# Patient Record
Sex: FEMALE | Race: WHITE | NOT HISPANIC OR LATINO | Employment: FULL TIME | URBAN - METROPOLITAN AREA
[De-identification: names, ages, dates, MRNs, and addresses within clinical notes are randomized per-mention and may not be internally consistent; named-entity substitution may affect disease eponyms.]

---

## 2019-05-09 ENCOUNTER — OFFICE VISIT (OUTPATIENT)
Dept: URGENT CARE | Facility: CLINIC | Age: 44
End: 2019-05-09
Payer: COMMERCIAL

## 2019-05-09 VITALS
RESPIRATION RATE: 18 BRPM | TEMPERATURE: 98.8 F | OXYGEN SATURATION: 97 % | DIASTOLIC BLOOD PRESSURE: 82 MMHG | SYSTOLIC BLOOD PRESSURE: 161 MMHG | HEART RATE: 85 BPM

## 2019-05-09 DIAGNOSIS — J06.9 VIRAL URI WITH COUGH: Primary | ICD-10-CM

## 2019-05-09 DIAGNOSIS — R60.0 LEG EDEMA, RIGHT: ICD-10-CM

## 2019-05-09 PROCEDURE — G0382 LEV 3 HOSP TYPE B ED VISIT: HCPCS | Performed by: NURSE PRACTITIONER

## 2019-05-09 PROCEDURE — S9083 URGENT CARE CENTER GLOBAL: HCPCS | Performed by: NURSE PRACTITIONER

## 2019-05-09 RX ORDER — OMEPRAZOLE 40 MG/1
40 CAPSULE, DELAYED RELEASE ORAL DAILY
Refills: 5 | COMMUNITY
Start: 2019-04-19 | End: 2019-07-25 | Stop reason: SDUPTHER

## 2019-05-09 RX ORDER — PROPRANOLOL HCL 60 MG
CAPSULE, EXTENDED RELEASE 24HR ORAL
COMMUNITY
Start: 2013-05-07 | End: 2019-07-09 | Stop reason: SDUPTHER

## 2019-05-13 ENCOUNTER — TRANSCRIBE ORDERS (OUTPATIENT)
Dept: ADMINISTRATIVE | Facility: HOSPITAL | Age: 44
End: 2019-05-13

## 2019-05-13 DIAGNOSIS — R60.9 EDEMA, UNSPECIFIED TYPE: Primary | ICD-10-CM

## 2019-05-13 DIAGNOSIS — R52 PAIN: ICD-10-CM

## 2019-05-16 ENCOUNTER — HOSPITAL ENCOUNTER (OUTPATIENT)
Dept: NON INVASIVE DIAGNOSTICS | Facility: HOSPITAL | Age: 44
Discharge: HOME | End: 2019-05-16
Payer: COMMERCIAL

## 2019-05-16 DIAGNOSIS — R60.9 EDEMA, UNSPECIFIED TYPE: ICD-10-CM

## 2019-05-16 DIAGNOSIS — R52 PAIN: ICD-10-CM

## 2019-05-16 PROCEDURE — 93970 EXTREMITY STUDY: CPT

## 2019-05-16 PROCEDURE — 93970 EXTREMITY STUDY: CPT | Performed by: SURGERY

## 2019-07-09 DIAGNOSIS — I10 HYPERTENSION, UNSPECIFIED TYPE: Primary | ICD-10-CM

## 2019-07-09 RX ORDER — PROPRANOLOL HCL 60 MG
60 CAPSULE, EXTENDED RELEASE 24HR ORAL DAILY
Qty: 90 CAPSULE | Refills: 3 | Status: SHIPPED | OUTPATIENT
Start: 2019-07-09 | End: 2020-06-30

## 2019-07-25 DIAGNOSIS — K21.9 GASTROESOPHAGEAL REFLUX DISEASE, ESOPHAGITIS PRESENCE NOT SPECIFIED: Primary | ICD-10-CM

## 2019-07-25 RX ORDER — OMEPRAZOLE 40 MG/1
40 CAPSULE, DELAYED RELEASE ORAL DAILY
Qty: 90 CAPSULE | Refills: 3 | Status: SHIPPED | OUTPATIENT
Start: 2019-07-25 | End: 2020-07-20

## 2019-10-28 DIAGNOSIS — R60.9 EDEMA, UNSPECIFIED TYPE: Primary | ICD-10-CM

## 2019-10-29 RX ORDER — FUROSEMIDE 20 MG/1
TABLET ORAL
Qty: 90 TABLET | Refills: 1 | Status: SHIPPED | OUTPATIENT
Start: 2019-10-29 | End: 2020-04-22 | Stop reason: SDUPTHER

## 2019-12-16 ENCOUNTER — OFFICE VISIT (OUTPATIENT)
Dept: FAMILY MEDICINE CLINIC | Facility: CLINIC | Age: 44
End: 2019-12-16
Payer: COMMERCIAL

## 2019-12-16 VITALS
DIASTOLIC BLOOD PRESSURE: 72 MMHG | WEIGHT: 191 LBS | HEART RATE: 77 BPM | HEIGHT: 63 IN | SYSTOLIC BLOOD PRESSURE: 128 MMHG | OXYGEN SATURATION: 96 % | TEMPERATURE: 98.9 F | BODY MASS INDEX: 33.84 KG/M2

## 2019-12-16 DIAGNOSIS — Z12.39 SCREENING FOR BREAST CANCER: ICD-10-CM

## 2019-12-16 DIAGNOSIS — E78.5 DYSLIPIDEMIA: ICD-10-CM

## 2019-12-16 DIAGNOSIS — R53.83 OTHER FATIGUE: ICD-10-CM

## 2019-12-16 DIAGNOSIS — S29.019A THORACIC MYOFASCIAL STRAIN, INITIAL ENCOUNTER: ICD-10-CM

## 2019-12-16 DIAGNOSIS — J01.90 ACUTE NON-RECURRENT SINUSITIS, UNSPECIFIED LOCATION: Primary | ICD-10-CM

## 2019-12-16 DIAGNOSIS — R07.89 MUSCULOSKELETAL CHEST PAIN: ICD-10-CM

## 2019-12-16 PROCEDURE — 1036F TOBACCO NON-USER: CPT | Performed by: FAMILY MEDICINE

## 2019-12-16 PROCEDURE — 99213 OFFICE O/P EST LOW 20 MIN: CPT | Performed by: FAMILY MEDICINE

## 2019-12-16 PROCEDURE — 3008F BODY MASS INDEX DOCD: CPT | Performed by: FAMILY MEDICINE

## 2019-12-16 RX ORDER — AMOXICILLIN 500 MG/1
500 CAPSULE ORAL EVERY 8 HOURS SCHEDULED
Qty: 30 CAPSULE | Refills: 0 | Status: SHIPPED | OUTPATIENT
Start: 2019-12-16 | End: 2019-12-26

## 2019-12-16 NOTE — PROGRESS NOTES
Assessment/Plan:    Musculoskeletal chest pain  Patient merlos is clearly musculoskeletal in etiology  Pain is reproducible upon compression  I have simply recommended she take Aleve twice a day with food until resolved  s some chest pain    Thoracic myofascial strain  Patient has a thoracic strain  I recommended moist heat to the affected area  Again, I feel that Aleve will help this problem  She should take it twice a day with food until improved  I have also stressed to the patient the importance of rest     Acute non-recurrent sinusitis  Patient has an acute sinusitis  She was started on oral antibiotics  I have also recommended she use Ayr nasal spray, 2 sprays to each nostril 3-4 times per day until improved       Diagnoses and all orders for this visit:    Acute non-recurrent sinusitis, unspecified location  -     amoxicillin (AMOXIL) 500 mg capsule; Take 1 capsule (500 mg total) by mouth every 8 (eight) hours for 10 days    Screening for breast cancer  -     Mammo screening bilateral w 3d & cad; Future    Other fatigue  -     Comprehensive metabolic panel; Future  -     TSH, 3rd generation; Future  -     CBC and differential; Future    Dyslipidemia  -     Lipid panel; Future    Thoracic myofascial strain, initial encounter    Musculoskeletal chest pain          Subjective:      Patient ID: Giovani Hernandez is a 40 y o  female  This patient is a 44-year-old white female who presents to the office today with complaints of her right eye being swollen and crusty, a postnasal drip, nasal congestion, cough, and slight chest congestion  It began 1 week ago  She denies any sputum production  She complains of postnasal drip  She denies shortness of breath  She denies fever and chills  She took Robitussin CF  She also complains of right shoulder pain  She wonders if it is related  She tells me the pain is in the area of the right scapula    She admits that she was doing a lot of heavy lifting and moving of furniture  She also complains of a pain in her chest if he pushes on it  She tells me she was wearing a scarf with a not on it  She leaned against a shopping cart and developed this pain  She has persistent pain if she touches the area now  The following portions of the patient's history were reviewed and updated as appropriate: allergies, current medications, past family history, past medical history, past social history, past surgical history and problem list     Review of Systems   Cardiovascular: Positive for chest pain (Patient reports pain in the left upper chest, in the shoulder area which radiates into the axilla  It only occurs with certain movements of her arm)  Gastrointestinal: Negative for abdominal distention, abdominal pain, blood in stool, constipation, diarrhea, nausea and vomiting  Musculoskeletal:        Reports right shoulder pain         Objective:      /72 (BP Location: Left arm, Patient Position: Sitting, Cuff Size: Large)   Pulse 77   Temp 98 9 °F (37 2 °C) (Tympanic)   Ht 5' 3 25" (1 607 m)   Wt 86 6 kg (191 lb)   SpO2 96%   BMI 33 57 kg/m²          Physical Exam   Constitutional:   Patient is a 51-year-old white female who appears her stated age  She is nonseptic in appearance and in no distress   HENT:   Head: Normocephalic and atraumatic  Right Ear: External ear normal    Left Ear: External ear normal    Mouth/Throat: Oropharynx is clear and moist  No oropharyngeal exudate  Tympanic membranes are clear  Nose was congested  Nasal turbinates are dry, red, and angry  There was no rhinorrhea noted  Eyes: Pupils are equal, round, and reactive to light  Conjunctivae are normal  No scleral icterus  Neck: Neck supple  No tracheal deviation present  No thyromegaly present  Cardiovascular: Normal rate, regular rhythm and normal heart sounds  Exam reveals no gallop and no friction rub  No murmur heard    Pulmonary/Chest: Effort normal and breath sounds normal  No stridor  No respiratory distress  She has no wheezes  She has no rales  Musculoskeletal:   There is full range of motion of the shoulders in all planes of movement  Empty can sign is negative  Jacobo impingement sign is negative  Neer impingement sign is negative  Negative for painful arc  No tenderness to palpation in the shoulder  There was tenderness to palpation in the rhomboid muscles and right upper thoracic musculature  Lymphadenopathy:     She has no cervical adenopathy  Vitals reviewed

## 2019-12-16 NOTE — PATIENT INSTRUCTIONS
Sinusitis   WHAT YOU NEED TO KNOW:   What is sinusitis? Sinusitis is inflammation or infection of your sinuses  It is most often caused by a virus  Acute sinusitis may last up to 12 weeks  Chronic sinusitis lasts longer than 12 weeks  Recurrent sinusitis means you have 4 or more times in 1 year  What increases my risk for sinusitis? · Medical conditions, such as an upper respiratory infection, allergies, asthma, or cystic fibrosis    · Dental infections or procedures, such as gum infections, tooth decay, or a root canal    · Smoking    · Abnormal sinus structure, such as nasal growths, swollen tonsils, or a deviated septum    · A weak immune system, from diseases such as diabetes or HIV  What are the signs and symptoms of sinusitis? · Fever    · Pain, pressure, redness, or swelling around the forehead, cheeks, or eyes    · Thick yellow or green discharge from your nose    · Tenderness when you touch your face over your sinuses    · Dry cough that happens mostly at night or when you lie down    · Headache and face pain that is worse when you lean forward    · Tooth pain, or pain when you chew  How is sinusitis diagnosed? Your healthcare provider will examine you and ask about your symptoms  He or she will check inside your nose using a nasal speculum  This is a small tool used to open your nostrils  A sample of the mucus from your nose may show what germ is causing your infection  How is sinusitis treated? Your symptoms may go away on their own  Your healthcare provider may recommend watchful waiting for up to 10 days before starting antibiotics  You may  need any of the following:  · Acetaminophen  decreases pain and fever  It is available without a doctor's order  Ask how much to take and how often to take it  Follow directions   Read the labels of all other medicines you are using to see if they also contain acetaminophen, or ask your doctor or pharmacist  Acetaminophen can cause liver damage if not taken correctly  Do not use more than 4 grams (4,000 milligrams) total of acetaminophen in one day  · NSAIDs , such as ibuprofen, help decrease swelling, pain, and fever  This medicine is available with or without a doctor's order  NSAIDs can cause stomach bleeding or kidney problems in certain people  If you take blood thinner medicine, always ask your healthcare provider if NSAIDs are safe for you  Always read the medicine label and follow directions  · Nasal steroid sprays  may help decrease inflammation in your nose and sinuses  · Decongestants  help reduce swelling and drain mucus in the nose and sinuses  They may help you breathe easier  · Antihistamines  help dry mucus in the nose and relieve sneezing  · Antibiotics  help treat or prevent a bacterial infection  How can I manage my symptoms? · Rinse your sinuses  Use a sinus rinse device to rinse your nasal passages with a saline (salt water) solution or distilled water  Do not use tap water  This will help thin the mucus in your nose and rinse away pollen and dirt  It will also help reduce swelling so you can breathe normally  Ask your healthcare provider how often to do this  · Breathe in steam   Heat a bowl of water until you see steam  Lean over the bowl and make a tent over your head with a large towel  Breathe deeply for about 20 minutes  Be careful not to get too close to the steam or burn yourself  Do this 3 times a day  You can also breathe deeply when you take a hot shower  · Sleep with your head elevated  Place an extra pillow under your head before you go to sleep to help your sinuses drain  · Drink liquids as directed  Ask your healthcare provider how much liquid to drink each day and which liquids are best for you  Liquids will thin the mucus in your nose and help it drain  Avoid drinks that contain alcohol or caffeine  · Do not smoke, and avoid secondhand smoke    Nicotine and other chemicals in cigarettes and cigars can make your symptoms worse  Ask your healthcare provider for information if you currently smoke and need help to quit  E-cigarettes or smokeless tobacco still contain nicotine  Talk to your healthcare provider before you use these products  How can I help prevent the spread of germs that cause sinusitis? Wash your hands often with soap and water  Wash your hands after you use the bathroom, change a child's diaper, or sneeze  Wash your hands before you prepare or eat food  When should I seek immediate care? · Your eye and eyelid are red, swollen, and painful  · You cannot open your eye  · You have vision changes, such as double vision  · Your eyeball bulges out or you cannot move your eye  · You are more sleepy than normal, or you notice changes in your ability to think, move, or talk  · You have a stiff neck, a fever, or a bad headache  · You have swelling of your forehead or scalp  When should I contact my healthcare provider? · Your symptoms do not improve after 3 days  · Your symptoms do not go away after 10 days  · You have nausea and are vomiting  · Your nose is bleeding  · You have questions or concerns about your condition or care  CARE AGREEMENT:   You have the right to help plan your care  Learn about your health condition and how it may be treated  Discuss treatment options with your caregivers to decide what care you want to receive  You always have the right to refuse treatment  The above information is an  only  It is not intended as medical advice for individual conditions or treatments  Talk to your doctor, nurse or pharmacist before following any medical regimen to see if it is safe and effective for you  © 2017 [employer] Information is for End User's use only and may not be sold, redistributed or otherwise used for commercial purposes   All illustrations and images included in CareNotes® are the copyrighted property of A [employer] M [employer]  or Christo Gauthier

## 2019-12-17 NOTE — ASSESSMENT & PLAN NOTE
Patient has an acute sinusitis  She was started on oral antibiotics    I have also recommended she use Ayr nasal spray, 2 sprays to each nostril 3-4 times per day until improved

## 2019-12-17 NOTE — ASSESSMENT & PLAN NOTE
Patient has a thoracic strain  I recommended moist heat to the affected area  Again, I feel that Aleve will help this problem  She should take it twice a day with food until improved    I have also stressed to the patient the importance of rest

## 2019-12-17 NOTE — ASSESSMENT & PLAN NOTE
Patient merlos is clearly musculoskeletal in etiology  Pain is reproducible upon compression    I have simply recommended she take Aleve twice a day with food until resolved  s some chest pain

## 2020-01-18 ENCOUNTER — HOSPITAL ENCOUNTER (OUTPATIENT)
Dept: MAMMOGRAPHY | Facility: HOSPITAL | Age: 45
Discharge: HOME | End: 2020-01-18
Attending: FAMILY MEDICINE
Payer: COMMERCIAL

## 2020-01-18 VITALS — HEIGHT: 63 IN | WEIGHT: 191 LBS | BODY MASS INDEX: 33.84 KG/M2

## 2020-01-18 DIAGNOSIS — Z12.39 SCREENING FOR BREAST CANCER: ICD-10-CM

## 2020-01-18 PROCEDURE — 77067 SCR MAMMO BI INCL CAD: CPT

## 2020-01-18 PROCEDURE — 77063 BREAST TOMOSYNTHESIS BI: CPT

## 2020-04-22 DIAGNOSIS — R60.9 EDEMA, UNSPECIFIED TYPE: ICD-10-CM

## 2020-04-22 RX ORDER — FUROSEMIDE 20 MG/1
20 TABLET ORAL AS NEEDED
Qty: 90 TABLET | Refills: 2 | Status: SHIPPED | OUTPATIENT
Start: 2020-04-22 | End: 2021-01-20

## 2020-06-30 DIAGNOSIS — I10 HYPERTENSION, UNSPECIFIED TYPE: ICD-10-CM

## 2020-06-30 RX ORDER — PROPRANOLOL HCL 60 MG
CAPSULE, EXTENDED RELEASE 24HR ORAL
Qty: 90 CAPSULE | Refills: 0 | Status: SHIPPED | OUTPATIENT
Start: 2020-06-30 | End: 2020-09-25

## 2020-07-20 DIAGNOSIS — K21.9 GASTROESOPHAGEAL REFLUX DISEASE, ESOPHAGITIS PRESENCE NOT SPECIFIED: ICD-10-CM

## 2020-07-20 RX ORDER — OMEPRAZOLE 40 MG/1
CAPSULE, DELAYED RELEASE ORAL
Qty: 90 CAPSULE | Refills: 3 | Status: SHIPPED | OUTPATIENT
Start: 2020-07-20 | End: 2021-08-02 | Stop reason: SDUPTHER

## 2020-07-27 ENCOUNTER — OFFICE VISIT (OUTPATIENT)
Dept: FAMILY MEDICINE CLINIC | Facility: CLINIC | Age: 45
End: 2020-07-27
Payer: COMMERCIAL

## 2020-07-27 VITALS
WEIGHT: 196.7 LBS | SYSTOLIC BLOOD PRESSURE: 126 MMHG | DIASTOLIC BLOOD PRESSURE: 82 MMHG | OXYGEN SATURATION: 97 % | HEIGHT: 63 IN | TEMPERATURE: 98.9 F | BODY MASS INDEX: 34.85 KG/M2 | HEART RATE: 80 BPM

## 2020-07-27 DIAGNOSIS — R13.10 DYSPHAGIA, UNSPECIFIED TYPE: ICD-10-CM

## 2020-07-27 DIAGNOSIS — F32.0 MAJOR DEPRESSIVE DISORDER, SINGLE EPISODE, MILD (HCC): ICD-10-CM

## 2020-07-27 DIAGNOSIS — G43.009 MIGRAINE WITHOUT AURA AND WITHOUT STATUS MIGRAINOSUS, NOT INTRACTABLE: Primary | ICD-10-CM

## 2020-07-27 PROCEDURE — 99213 OFFICE O/P EST LOW 20 MIN: CPT | Performed by: FAMILY MEDICINE

## 2020-07-27 PROCEDURE — 3079F DIAST BP 80-89 MM HG: CPT | Performed by: FAMILY MEDICINE

## 2020-07-27 PROCEDURE — 3008F BODY MASS INDEX DOCD: CPT | Performed by: FAMILY MEDICINE

## 2020-07-27 PROCEDURE — 3074F SYST BP LT 130 MM HG: CPT | Performed by: FAMILY MEDICINE

## 2020-07-27 PROCEDURE — 1036F TOBACCO NON-USER: CPT | Performed by: FAMILY MEDICINE

## 2020-07-27 RX ORDER — BUPROPION HYDROCHLORIDE 150 MG/1
TABLET, EXTENDED RELEASE ORAL
Qty: 60 TABLET | Refills: 5 | Status: SHIPPED | OUTPATIENT
Start: 2020-07-27 | End: 2021-01-20

## 2020-07-27 NOTE — ASSESSMENT & PLAN NOTE
Patient requested referral to Gastroenterology  She had seen Dr Enma Alexander in the past   She did not want to see him in his [place] office  Patient was referred to Dr Nelly Fortune for further evaluation of her dysphagia  She will continue omeprazole 40 mg daily

## 2020-07-27 NOTE — PROGRESS NOTES
Assessment/Plan:    Major depressive disorder, single episode, mild ([address] )  Patient will be restarted on bupropion  mg  She will take 1 daily for 3 days, then 1 twice a day  Migraine without aura and without status migrainosus, not intractable  Migraines are currently stable  Patient will continue propranolol ER 60 mg daily    Dysphagia  Patient requested referral to Gastroenterology  She had seen Dr Vikas Langford in the past   She did not want to see him in his [id number] [facility] office  Patient was referred to Dr Joelle Jordan for further evaluation of her dysphagia  She will continue omeprazole 40 mg daily  Diagnoses and all orders for this visit:    Migraine without aura and without status migrainosus, not intractable    Dysphagia, unspecified type  -     Ambulatory referral to Gastroenterology; Future    Major depressive disorder, single episode, mild ([facility])  -     buPROPion (WELLBUTRIN SR) 150 mg 12 hr tablet; Take 1 daily x 3 days, then 1 twice a day        BMI Counseling: Body mass index is 34 84 kg/m²  The BMI is above normal  Nutrition recommendations include reducing portion sizes, decreasing overall calorie intake, 3-5 servings of fruits/vegetables daily, reducing fast food intake, consuming healthier snacks, decreasing soda and/or juice intake and moderation in carbohydrate intake  Exercise recommendations include exercising 3-5 times per week  Subjective:      Patient ID: Brookie Felty is a 40 y o  female  This is a 80-year-old white female presents to the office today for her routine checkup  Patient has been working from home  As a result, she has gained weight since her last visit  She complains of feeling stressed  She complains of fatigue  She wants to go back on bupropion  This helped her in the past   She tells me she is starting to have a problem with food sticking    She had had this in the past   Her gastroenterologist had spoke to her about esophageal dilation but she never went through with the procedure  Her last EGD was 5 years ago  She denies any heartburn  The following portions of the patient's history were reviewed and updated as appropriate: allergies, current medications, past family history, past medical history, past social history, past surgical history and problem list     Review of Systems   Constitutional: Positive for fatigue  HENT: Positive for trouble swallowing  Cardiovascular: Positive for leg swelling (Reports ankle edema)  Negative for chest pain and palpitations  Gastrointestinal: Negative for abdominal distention, abdominal pain, blood in stool, constipation, diarrhea and nausea  Denies heartburn   Musculoskeletal: Positive for myalgias  Neurological: Negative for headaches  Psychiatric/Behavioral:        Reports stress         Objective:      /82 (BP Location: Left arm, Patient Position: Sitting, Cuff Size: Adult)   Pulse 80   Temp 98 9 °F (37 2 °C) (Temporal)   Ht 5' 3" (1 6 m)   Wt 89 2 kg (196 lb 11 2 oz)   SpO2 97%   BMI 34 84 kg/m²          Physical Exam   Constitutional:   The patient is an obese 60-year-old white female who appears her stated age  She is in no distress   HENT:   Head: Normocephalic and atraumatic  Right Ear: External ear normal    Left Ear: External ear normal    Mouth/Throat: Oropharynx is clear and moist  No oropharyngeal exudate  Tympanic membranes are clear   Eyes: Pupils are equal, round, and reactive to light  Conjunctivae are normal  Right eye exhibits no discharge  Left eye exhibits no discharge  No scleral icterus  Neck: Neck supple  No tracheal deviation present  No thyromegaly present  Cardiovascular: Normal rate, regular rhythm and normal heart sounds  Exam reveals no gallop and no friction rub  No murmur heard  Pulmonary/Chest: Effort normal and breath sounds normal  No stridor  No respiratory distress  She has no wheezes  She has no rales  Abdominal: Soft   Bowel sounds are normal  She exhibits no distension and no mass  There is no tenderness  There is no guarding  No hepatosplenomegaly is noted   Lymphadenopathy:     She has no cervical adenopathy  Psychiatric: She has a normal mood and affect  Her behavior is normal  Judgment and thought content normal    Vitals reviewed      extremities:  Without cyanosis, clubbing, or edema

## 2020-07-27 NOTE — PATIENT INSTRUCTIONS
Low-Sodium Diet   AMBULATORY CARE:   A low-sodium diet  limits foods that are high in sodium (salt)  You will need to follow a low-sodium diet if you have high blood pressure, kidney disease, or heart failure  You may also need to follow this diet if you have a condition that is causing your body to retain (hold) extra fluid  You may need to limit the amount of sodium you eat to 1,500 mg  Ask your healthcare provider how much sodium you can have each day  How to use food labels to choose foods that are low in sodium:  Read food labels to find the amount of sodium they contain  The amount of sodium is listed in milligrams (mg)  The % Daily Value (DV) column tells you how much of your daily needs are met by 1 serving of the food for each nutrient listed  Choose foods that have less than 5% of the DV of sodium  These foods are considered low in sodium  Foods that have 20% or more of the DV of sodium are considered high in sodium  Some food labels may also list any of the following terms that tell you about the sodium content in the food:  · Sodium-free:  Less than 5 mg in each serving    · Very low sodium:  35 mg of sodium or less in each serving    · Low sodium:  140 mg of sodium or less in each serving    · Reduced sodium: At least 25% less sodium in each serving than the regular type    · Light in sodium:  50% less sodium in each serving    · Unsalted or no added salt:  No extra salt is added during processing (the food may still contain sodium)  Foods to avoid:  Salty foods are high in sodium   You should avoid the following:  · Processed foods:      ¨ Mixes for cornbread, biscuits, cake, and pudding     ¨ Instant foods, such as potatoes, cereals, noodles, and rice     ¨ Packaged foods, such as bread stuffing, rice and pasta mixes, snack dip mixes, and macaroni and cheese     ¨ Canned foods, such as canned vegetables, soups, broths, sauces, and vegetable or tomato juice    ¨ Snack foods, such as salted chips, popcorn, pretzels, pork rinds, salted crackers, and salted nuts    ¨ Frozen foods, such as dinners, entrees, vegetables with sauces, and breaded meats    ¨ Sauerkraut, pickled vegetables, and other foods prepared in brine    · Meats and cheeses:      ¨ Smoked or cured meat, such as corned beef, dobson, ham, hot dogs, and sausage    ¨ Canned meats or spreads, such as potted meats, sardines, anchovies, and imitation seafood    ¨ Deli or lunch meats, such as bologna, ham, turkey, and roast beef    ¨ Processed cheese, such as American cheese and cheese spreads    · Condiments, sauces, and seasonings:      ¨ Salt (¼ teaspoon of salt contains 575 mg of sodium)    ¨ Seasonings made with salt, such as garlic salt, celery salt, onion salt, and seasoned salt    ¨ Regular soy sauce, barbecue sauce, teriyaki sauce, steak sauce, Worcestershire sauce, and most flavored vinegars    ¨ Canned gravy and mixes     ¨ Regular condiments, such as mustard, ketchup, and salad dressings    ¨ Pickles and olives    ¨ Meat tenderizers and monosodium glutamate (MSG)  Foods to include:  Read the food label to find the exact amount of sodium in each serving  · Bread and cereal:  Try to choose breads with less than 80 mg of sodium per serving  ¨ Bread, roll, ed, tortilla, or unsalted crackers  ¨ Ready-to-eat cereals with less than 5% DV of sodium (examples include shredded wheat and puffed rice)    ¨ Pasta    · Vegetables and fruits:      ¨ Unsalted fresh, frozen, or canned vegetables    ¨ Fresh, frozen, or canned fruits    ¨ Fruit juice    · Dairy:  One serving has about 150 mg of sodium  ¨ Milk, all types    ¨ Yogurt    ¨ Hard cheese, such as cheddar, Swiss, [employer], or mozzarella    · Meat and other protein foods:  Some raw meats may have added sodium       ¨ Plain meats, fish, and poultry     ¨ Eggs    · Other foods:      ¨ Homemade pudding    ¨ Unsalted nuts, popcorn, or pretzels    ¨ Unsalted butter or margarine  Ways to decrease sodium:   · Add spices and herbs to foods instead of salt during cooking  Use salt-free seasonings to add flavor to foods  Examples include onion powder, garlic powder, basil, emanuel powder, paprika, and parsley  Try lemon or lime juice or vinegar to give foods a tart flavor  Use hot peppers, pepper, or cayenne pepper to add a spicy flavor to foods  · Do not keep a salt shaker at your kitchen table  This may help keep you from adding salt to food at the table  It may take time to get used to enjoying the natural flavor of food instead of adding salt  Talk to your healthcare provider before you use salt substitutes  Some salt substitutes have a high amount of potassium and need to be avoided if you have kidney disease  · Choose low-sodium foods at restaurants  Meals from restaurants are often high in sodium  Some restaurants have nutrition information on the menu that tells you the amount of sodium in their foods  If possible, ask for your food to be prepared with less, or no salt  · Shop for unsalted or low-sodium foods and snacks at the grocery store  Examples include unsalted or low-sodium broths, soups, and canned vegetables  Choose fresh or frozen vegetables instead  Choose unsalted nuts or seeds or fresh fruits or vegetables as snacks  Read food labels and choose salt-free, very low-sodium, or low-sodium foods  © 2017 [employer] Information is for End User's use only and may not be sold, redistributed or otherwise used for commercial purposes  All illustrations and images included in CareNotes® are the copyrighted property of [employer] M [employer] Inc  or Christo Gauthier  The above information is an  only  It is not intended as medical advice for individual conditions or treatments  Talk to your doctor, nurse or pharmacist before following any medical regimen to see if it is safe and effective for you

## 2020-08-13 ENCOUNTER — CONSULT (OUTPATIENT)
Dept: GASTROENTEROLOGY | Facility: CLINIC | Age: 45
End: 2020-08-13
Payer: COMMERCIAL

## 2020-08-13 VITALS
DIASTOLIC BLOOD PRESSURE: 66 MMHG | SYSTOLIC BLOOD PRESSURE: 124 MMHG | HEART RATE: 81 BPM | BODY MASS INDEX: 33.84 KG/M2 | HEIGHT: 63 IN | WEIGHT: 191 LBS | RESPIRATION RATE: 12 BRPM | TEMPERATURE: 99.3 F

## 2020-08-13 DIAGNOSIS — K21.9 GASTROESOPHAGEAL REFLUX DISEASE, ESOPHAGITIS PRESENCE NOT SPECIFIED: Primary | ICD-10-CM

## 2020-08-13 DIAGNOSIS — Z79.899 MEDICATION MANAGEMENT: ICD-10-CM

## 2020-08-13 DIAGNOSIS — R13.10 DYSPHAGIA, UNSPECIFIED TYPE: ICD-10-CM

## 2020-08-13 PROCEDURE — 3008F BODY MASS INDEX DOCD: CPT | Performed by: INTERNAL MEDICINE

## 2020-08-13 PROCEDURE — 3078F DIAST BP <80 MM HG: CPT | Performed by: INTERNAL MEDICINE

## 2020-08-13 PROCEDURE — 99244 OFF/OP CNSLTJ NEW/EST MOD 40: CPT | Performed by: INTERNAL MEDICINE

## 2020-08-13 PROCEDURE — 3074F SYST BP LT 130 MM HG: CPT | Performed by: INTERNAL MEDICINE

## 2020-08-13 PROCEDURE — 1036F TOBACCO NON-USER: CPT | Performed by: INTERNAL MEDICINE

## 2020-08-13 NOTE — PATIENT INSTRUCTIONS
Dysphagia  Patient has been experiencing dysphagia to solids  She points to her sternal notch as to where food will stick  Does not sound like she has had a complete obstruction  She had this problem back in 2008 it did improve with Dexilant  She was told that she had rings and webs throughout the esophagus  She was not dilated  I cannot find the prior endoscopy report or biopsies  I suspect though she may have eosinophilic esophagitis  Other possibilities would include esophageal ring, web, stricture less likely lesion  I would suggest starting the evaluation with esophagram with barium tablet  She will be scheduled for upper endoscopy  I explained to the patient the procedure of upper endoscopy as well as its potential risk which are approximately 1 in 1000 chance of bleeding, infection, and perforation  GERD (gastroesophageal reflux disease)  Patient has reflux symptoms dating back probably over 20 years  She has had breakthrough symptoms on Nexium and pantoprazole and is now doing fairly well with omeprazole 40 milligrams daily  She felt that Dexilant 60 milligrams work best for her however  I can try to represcribed Dexilant 60 milligrams daily to see if this is covered  If not continue omeprazole 40 milligrams daily  I did explain that this medication is most efficacious if taken 30 minutes to 1 hour prior to a meal   She has been using around bedtime  Lifestyle modifications for gastroesophageal reflux disease were discussed and include limiting fried and fatty foods, mints, chocolates, carbonated and caffeinated beverages , and alcohol, etc   Avoid lying down for 2-3 hours after meals  If you have nighttime symptoms consider raising the head of the bed up on 4-6 inch blocks  Pillows typically are not useful  If you are overweight, weight loss will be helpful  Medication management  Patient has required long-term proton pump inhibitor    She did have a CBC in January that was normal  Her renal function was normal as well at that time  I would suggest considering CBC, renal function, B12, folate, and magnesium about yearly while on long-term PPI  Next time she has blood work done, check B12, folate, and magnesium

## 2020-08-13 NOTE — PROGRESS NOTES
[address] Gastroenterology  Gastroenterology Outpatient Consultation  Patient Dario Babb   Age 40 y o  Gender female   MRN: [id number]  [facility] [id number]     ASSESSMENT AND PLAN:   Problem List Items Addressed This Visit        Digestive    Dysphagia     Patient has been experiencing dysphagia to solids  She points to her sternal notch as to where food will stick  Does not sound like she has had a complete obstruction  She had this problem back in 2008 it did improve with Dexilant  She was told that she had rings and webs throughout the esophagus  She was not dilated  I cannot find the prior endoscopy report or biopsies  I suspect though she may have eosinophilic esophagitis  Other possibilities would include esophageal ring, web, stricture less likely lesion  I would suggest starting the evaluation with esophagram with barium tablet  She will be scheduled for upper endoscopy  I explained to the patient the procedure of upper endoscopy as well as its potential risk which are approximately 1 in 1000 chance of bleeding, infection, and perforation  Relevant Medications    dexlansoprazole (Dexilant) 60 MG capsule    Other Relevant Orders    FL barium swallow w air contrast    EGD    GERD (gastroesophageal reflux disease) - Primary     Patient has reflux symptoms dating back probably over 20 years  She has had breakthrough symptoms on Nexium and pantoprazole and is now doing fairly well with omeprazole 40 milligrams daily  She felt that Dexilant 60 milligrams work best for her however  I can try to represcribed Dexilant 60 milligrams daily to see if this is covered  If not continue omeprazole 40 milligrams daily  I did explain that this medication is most efficacious if taken 30 minutes to 1 hour prior to a meal   She has been using around bedtime    Lifestyle modifications for gastroesophageal reflux disease were discussed and include limiting fried and fatty foods, mints, chocolates, carbonated and caffeinated beverages , and alcohol, etc   Avoid lying down for 2-3 hours after meals  If you have nighttime symptoms consider raising the head of the bed up on 4-6 inch blocks  Pillows typically are not useful  If you are overweight, weight loss will be helpful  Relevant Medications    dexlansoprazole (Dexilant) 60 MG capsule    Other Relevant Orders    EGD       Other    Medication management     Patient has required long-term proton pump inhibitor  She did have a CBC in January that was normal   Her renal function was normal as well at that time  I would suggest considering CBC, renal function, B12, folate, and magnesium about yearly while on long-term PPI  Next time she has blood work done, check B12, folate, and magnesium  Relevant Orders    Vitamin B12    Folate    Magnesium         _____________________________________________________________    HPI: Nolberto Medina is a delightful 75-year-old woman who I am seeing in consultation for evaluation of dysphagia  She reports that having dysphagia back around 2008  She was evaluated by Dr Pamella Christine and underwent upper endoscopy  She was told that she had rings and webs in her esophagus  He offered her dilatation but she did not have that done  She went on Dexilant 60 milligrams daily which improved her dysphagia  In fact she did well until about 4 years ago when her insurance no longer cover the [address] [place] [address]  She was switched to a variety of other proton pump inhibitors including Nexium, pantoprazole, and most recently omeprazole 40 milligrams daily  Seems like the pantoprazole and Nexium did not help her reflux  Her reflux seems to be well controlled currently on omeprazole 40 milligrams once a day  She rarely if ever has breakthrough heartburn  The last 2 months she has noticed dysphagia to solids  Food may stick for for 1-2 seconds and then eventually passed    She will just breathe through her nose and wait for the past   She points to her sternal notch as to where she feels the food stick  Does not sound like there has been a complete obstruction her esophagus  She does report some nausea but no vomiting  This may occur 1 to 2 times a week  Typically after breakfast   May last up to 30 minutes  Her bowel habits are very regular, denies any diarrhea, constipation, bleeding or black stools  There has been no weight loss  She will take an occasional Aleve, infrequently  She denies any significant allergies or allergic reactions  There is no family history colon cancer colon polyps, esophageal cancer, or pancreatic cancer  She quit smoking a little over year ago  She will drink alcohol on occasion  No Known Allergies  Current Outpatient Medications   Medication Sig Dispense Refill    buPROPion (WELLBUTRIN SR) 150 mg 12 hr tablet Take 1 daily x 3 days, then 1 twice a day 60 tablet 5    furosemide (LASIX) 20 mg tablet Take 1 tablet (20 mg total) by mouth as needed (edema) 90 tablet 2    omeprazole (PriLOSEC) 40 MG capsule TAKE 1 CAPSULE BY MOUTH EVERY DAY 90 capsule 3    propranolol (INDERAL LA) 60 mg 24 hr capsule TAKE 1 CAPSULE BY MOUTH EVERY DAY 90 capsule 0    dexlansoprazole (Dexilant) 60 MG capsule Take 1 capsule (60 mg total) by mouth daily 90 capsule 3     No current facility-administered medications for this visit        MEDICAL HISTORY:  Past Medical History:   Diagnosis Date    GERD (gastroesophageal reflux disease)     Hypertension      Past Surgical History:   Procedure Laterality Date    OTHER SURGICAL HISTORY      uterine ablation    TUBAL LIGATION       Social History     Substance and Sexual Activity   Alcohol Use Not Currently     Social History     Substance and Sexual Activity   Drug Use Never     Social History     Tobacco Use   Smoking Status Former Smoker   Smokeless Tobacco Never Used     Family History   Problem Relation Age of Onset    Hypertension Mother     Diabetes Mother     Uterine cancer Mother     Hypertension Father     Diabetes Father     No Known Problems Sister     No Known Problems Maternal Grandfather     Colon cancer Paternal Grandmother     Lung cancer Paternal Grandfather     No Known Problems Maternal Aunt     No Known Problems Maternal Aunt     Breast cancer Maternal Aunt         Early 42's    No Known Problems Paternal Aunt     No Known Problems Paternal Aunt        REVIEW OF SYSTEMS:  CONSTITUTIONAL: Denies any fever, chills, rigors, and weight loss  HEENT: No earache or tinnitus  Denies hearing loss or visual disturbances  CARDIOVASCULAR: No chest pain or palpitations  RESPIRATORY: Denies any cough, hemoptysis, shortness of breath or dyspnea on exertion  GASTROINTESTINAL: As noted in the History of Present Illness  GENITOURINARY: No problems with urination  Denies any hematuria or dysuria  NEUROLOGIC: No dizziness or vertigo, denies headaches  MUSCULOSKELETAL: Denies any muscle or joint pain  SKIN: Denies skin rashes or itching  ENDOCRINE: Denies excessive thirst  Denies intolerance to heat or cold  PSYCHOSOCIAL: Denies depression or anxiety  Denies any recent memory loss  Objective   Blood pressure 124/66, pulse 81, temperature 99 3 °F (37 4 °C), temperature source Temporal, resp  rate 12, height 5' 3" (1 6 m), weight 86 6 kg (191 lb)  Body mass index is 33 83 kg/m²  PHYSICAL EXAM:   General Appearance: Alert, cooperative, no distress  HEENT: Normocephalic, atraumatic, anicteric  Neck: Supple, symmetrical, trachea midline  Lungs: Clear to auscultation bilaterally; no rales, rhonchi or wheezing; respirations unlabored   Heart: Regular rate and rhythm; no murmur, rub, or gallop  Abdomen: Soft, bowel sounds normal, non-tender, non-distended; no masses, there is no hepatosplenomegaly  No spider angiomas  Abdominal strie    Genitalia: Deferred   Rectal: Deferred   Extremities: No cyanosis, clubbing or edema   Skin: No jaundice, rashes, or lesions   Lymph nodes: No palpable cervical lymphadenopathy   Lab Results:   No visits with results within 2 Month(s) from this visit  Latest known visit with results is:   No results found for any previous visit  Radiology Results:   No results found    One Melony Dick DO   08/13/20   Cc:

## 2020-08-13 NOTE — ASSESSMENT & PLAN NOTE
Patient has been experiencing dysphagia to solids  She points to her sternal notch as to where food will stick  Does not sound like she has had a complete obstruction  She had this problem back in 2008 it did improve with Dexilant  She was told that she had rings and webs throughout the esophagus  She was not dilated  I cannot find the prior endoscopy report or biopsies  I suspect though she may have eosinophilic esophagitis  Other possibilities would include esophageal ring, web, stricture less likely lesion  I would suggest starting the evaluation with esophagram with barium tablet  She will be scheduled for upper endoscopy  I explained to the patient the procedure of upper endoscopy as well as its potential risk which are approximately 1 in 1000 chance of bleeding, infection, and perforation

## 2020-08-13 NOTE — Clinical Note
Please schedule patient for esophagram with barium tablet  She will also need upper endoscopy, please allow 45 minutes as possible dilatation  Please ask the lab to have Savary dilators available

## 2020-08-13 NOTE — ASSESSMENT & PLAN NOTE
Patient has required long-term proton pump inhibitor  She did have a CBC in January that was normal   Her renal function was normal as well at that time  I would suggest considering CBC, renal function, B12, folate, and magnesium about yearly while on long-term PPI  Next time she has blood work done, check B12, folate, and magnesium

## 2020-08-13 NOTE — ASSESSMENT & PLAN NOTE
Patient has reflux symptoms dating back probably over 20 years  She has had breakthrough symptoms on Nexium and pantoprazole and is now doing fairly well with omeprazole 40 milligrams daily  She felt that Dexilant 60 milligrams work best for her however  I can try to represcribed Dexilant 60 milligrams daily to see if this is covered  If not continue omeprazole 40 milligrams daily  I did explain that this medication is most efficacious if taken 30 minutes to 1 hour prior to a meal   She has been using around bedtime  Lifestyle modifications for gastroesophageal reflux disease were discussed and include limiting fried and fatty foods, mints, chocolates, carbonated and caffeinated beverages , and alcohol, etc   Avoid lying down for 2-3 hours after meals  If you have nighttime symptoms consider raising the head of the bed up on 4-6 inch blocks  Pillows typically are not useful  If you are overweight, weight loss will be helpful

## 2020-08-27 ENCOUNTER — TELEPHONE (OUTPATIENT)
Dept: GASTROENTEROLOGY | Facility: MEDICAL CENTER | Age: 45
End: 2020-08-27

## 2020-08-27 NOTE — TELEPHONE ENCOUNTER
Spoke with pt provided cs# she will call and schedule barium swallow and call back to schedule egd   Pt has my direct line

## 2020-08-27 NOTE — TELEPHONE ENCOUNTER
----- Message from Severa Russian, DO sent at 8/13/2020  4:43 PM EDT -----  Please schedule patient for esophagram with barium tablet  She will also need upper endoscopy, please allow 45 minutes as possible dilatation  Please ask the lab to have Savary dilators available

## 2020-08-27 NOTE — PROGRESS NOTES
Spoke with pt and provided her with cs # she will call and schedule barium swallow and call me back to schedule egd

## 2020-08-28 NOTE — TELEPHONE ENCOUNTER
Pt is scheduled at  with dr Thom Yang for an egd on 9/22/20, pt has instructions from office visit  Patient is aware she will need a  to and from   She will get a call the day before with an exact time for arrival   pts barium swallow is scheduled 9/2

## 2020-09-02 ENCOUNTER — HOSPITAL ENCOUNTER (OUTPATIENT)
Dept: RADIOLOGY | Facility: HOSPITAL | Age: 45
Discharge: HOME | End: 2020-09-02
Attending: INTERNAL MEDICINE
Payer: COMMERCIAL

## 2020-09-02 DIAGNOSIS — R13.10 DYSPHAGIA, UNSPECIFIED TYPE: ICD-10-CM

## 2020-09-02 PROCEDURE — 74221 X-RAY XM ESOPHAGUS 2CNTRST: CPT

## 2020-09-02 NOTE — RESULT ENCOUNTER NOTE
Please inform patient that x-ray of the esophagus revealed a small hiatal hernia in addition to a ring in the distal esophagus called a Schatzki's ring however this did not delayed passage of a barium tablet  Proceed with upper endoscopy    Thank you

## 2020-09-22 ENCOUNTER — HOSPITAL ENCOUNTER (OUTPATIENT)
Dept: GASTROENTEROLOGY | Facility: HOSPITAL | Age: 45
Setting detail: OUTPATIENT SURGERY
Discharge: HOME | End: 2020-09-22
Attending: INTERNAL MEDICINE
Payer: COMMERCIAL

## 2020-09-22 ENCOUNTER — ANESTHESIA (OUTPATIENT)
Dept: GASTROENTEROLOGY | Facility: HOSPITAL | Age: 45
End: 2020-09-22

## 2020-09-22 ENCOUNTER — ANESTHESIA EVENT (OUTPATIENT)
Dept: GASTROENTEROLOGY | Facility: HOSPITAL | Age: 45
End: 2020-09-22

## 2020-09-22 VITALS
TEMPERATURE: 98.2 F | WEIGHT: 191 LBS | RESPIRATION RATE: 18 BRPM | BODY MASS INDEX: 33.84 KG/M2 | DIASTOLIC BLOOD PRESSURE: 55 MMHG | HEIGHT: 63 IN | OXYGEN SATURATION: 94 % | HEART RATE: 69 BPM | SYSTOLIC BLOOD PRESSURE: 96 MMHG

## 2020-09-22 VITALS — HEART RATE: 69 BPM

## 2020-09-22 DIAGNOSIS — R13.10 DYSPHAGIA, UNSPECIFIED TYPE: ICD-10-CM

## 2020-09-22 DIAGNOSIS — K21.9 GASTROESOPHAGEAL REFLUX DISEASE, ESOPHAGITIS PRESENCE NOT SPECIFIED: ICD-10-CM

## 2020-09-22 LAB
EXT PREGNANCY TEST URINE: NEGATIVE
EXT. CONTROL: NORMAL

## 2020-09-22 PROCEDURE — 88313 SPECIAL STAINS GROUP 2: CPT | Performed by: PATHOLOGY

## 2020-09-22 PROCEDURE — 88305 TISSUE EXAM BY PATHOLOGIST: CPT | Performed by: PATHOLOGY

## 2020-09-22 PROCEDURE — 81025 URINE PREGNANCY TEST: CPT | Performed by: INTERNAL MEDICINE

## 2020-09-22 PROCEDURE — 43239 EGD BIOPSY SINGLE/MULTIPLE: CPT | Performed by: INTERNAL MEDICINE

## 2020-09-22 RX ORDER — LIDOCAINE HYDROCHLORIDE 10 MG/ML
0.5 INJECTION, SOLUTION EPIDURAL; INFILTRATION; INTRACAUDAL; PERINEURAL ONCE AS NEEDED
Status: DISCONTINUED | OUTPATIENT
Start: 2020-09-22 | End: 2020-09-26 | Stop reason: HOSPADM

## 2020-09-22 RX ORDER — LIDOCAINE HYDROCHLORIDE 20 MG/ML
15 SOLUTION OROPHARYNGEAL
Status: DISCONTINUED | OUTPATIENT
Start: 2020-09-22 | End: 2020-09-26 | Stop reason: HOSPADM

## 2020-09-22 RX ORDER — SODIUM CHLORIDE, SODIUM LACTATE, POTASSIUM CHLORIDE, CALCIUM CHLORIDE 600; 310; 30; 20 MG/100ML; MG/100ML; MG/100ML; MG/100ML
125 INJECTION, SOLUTION INTRAVENOUS CONTINUOUS
Status: DISCONTINUED | OUTPATIENT
Start: 2020-09-22 | End: 2020-09-26 | Stop reason: HOSPADM

## 2020-09-22 RX ORDER — PROPOFOL 10 MG/ML
INJECTION, EMULSION INTRAVENOUS AS NEEDED
Status: DISCONTINUED | OUTPATIENT
Start: 2020-09-22 | End: 2020-09-22

## 2020-09-22 RX ORDER — LIDOCAINE HYDROCHLORIDE 20 MG/ML
15 SOLUTION OROPHARYNGEAL 4 TIMES DAILY PRN
Status: DISCONTINUED | OUTPATIENT
Start: 2020-09-22 | End: 2020-09-22

## 2020-09-22 RX ADMIN — PROPOFOL 50 MG: 10 INJECTION, EMULSION INTRAVENOUS at 13:08

## 2020-09-22 RX ADMIN — PROPOFOL 50 MG: 10 INJECTION, EMULSION INTRAVENOUS at 13:13

## 2020-09-22 RX ADMIN — PROPOFOL 20 MG: 10 INJECTION, EMULSION INTRAVENOUS at 13:18

## 2020-09-22 RX ADMIN — SODIUM CHLORIDE, SODIUM LACTATE, POTASSIUM CHLORIDE, AND CALCIUM CHLORIDE 125 ML/HR: .6; .31; .03; .02 INJECTION, SOLUTION INTRAVENOUS at 12:15

## 2020-09-22 RX ADMIN — PROPOFOL 50 MG: 10 INJECTION, EMULSION INTRAVENOUS at 13:15

## 2020-09-22 RX ADMIN — PROPOFOL 50 MG: 10 INJECTION, EMULSION INTRAVENOUS at 13:10

## 2020-09-22 NOTE — DISCHARGE INSTRUCTIONS
Upper Endoscopy   WHAT YOU NEED TO KNOW:   An upper endoscopy is also called an upper gastrointestinal (GI) endoscopy, or an esophagogastroduodenoscopy (EGD)  You may feel bloated, gassy, or have some abdominal discomfort after your procedure  Your throat may be sore for 24 to 36 hours  You may burp or pass gas from air that is still inside your body  DISCHARGE INSTRUCTIONS:   Call 911 for any of the following:   · You have sudden chest pain or trouble breathing  Seek care immediately if:   · You feel dizzy or faint  · You have trouble swallowing  · Your bowel movements are very dark or black  · Your abdomen is hard and firm and you have severe pain  · You vomit blood  Contact your healthcare provider if:   · You feel full or bloated and cannot burp or pass gas  · You have not had a bowel movement for 3 days after your procedure  · You have neck pain  · You have a fever or chills  · You have nausea or are vomiting  · You have a rash or hives  · You have questions or concerns about your endoscopy  Relieve a sore throat:  Suck on throat lozenges or crushed ice  Gargle with a small amount of warm salt water  Mix 1 teaspoon of salt and 1 cup of warm water to make salt water  Relieve gas and discomfort from bloating:  Lie on your right side with a heating pad on your abdomen  Take short walks to help pass gas  Eat small meals until bloating is relieved  Rest after your procedure: You have been given medicine to relax you  Do not  drive or make important decisions until the day after your procedure  Return to your normal activity as directed  You can usually return to work the day after your procedure  Follow up with your healthcare provider as directed:  Write down your questions so you remember to ask them during your visits     © 2017 [address] is for End User's use only and may not be sold, redistributed or otherwise used for commercial purposes  All illustrations and images included in CareNotes® are the copyrighted property of A D A M , Inc  or Christo Gauthier  The above information is an  only  It is not intended as medical advice for individual conditions or treatments  Talk to your doctor, nurse or pharmacist before following any medical regimen to see if it is safe and effective for you

## 2020-09-22 NOTE — H&P
History and Physical - [facility]  Genesis Tirado 40 y o  female MRN: [id number]                  HPI: Genesis Tirado is a 40y o  year old female who presents for EGD  Patient reports that she continues to experience dysphagia  She points to her in office to were food may stick  Food mainly stick for few seconds then passed  She has not had the room food back up  Does not sound like she has had a complete obstruction of her esophagus  She has been using omeprazole 40 mg once a day which seems to control her reflux  She did have an esophagram that revealed a small hiatal hernia and it states there is a Schatzki's ring  A barium tablet passed without delay  Patient informed me though that she had a hard time initiating swallowing the barium tablet  Once again into her esophagus she stated there was no delay  Prior should have a history of dysphagia back in   Dexilant seem to help that  Larissa Cline was prescribed however she cannot get this approved  In reviewing her initial office consultation she was told by Dr Donovan Rievra in the past that she had rings and webs in her esophagus  She was altered dilatation but declined as her swallowing problem improved  REVIEW OF SYSTEMS: Per the HPI, and otherwise unremarkable      Historical Information   Past Medical History:   Diagnosis Date    GERD (gastroesophageal reflux disease)     Hypertension      Past Surgical History:   Procedure Laterality Date    OTHER SURGICAL HISTORY      uterine ablation    TUBAL LIGATION       Social History   Social History     Substance and Sexual Activity   Alcohol Use Not Currently     Social History     Substance and Sexual Activity   Drug Use Never     Social History     Tobacco Use   Smoking Status Former Smoker    Packs/day: 1 00    Last attempt to quit: 2020    Years since quittin 2   Smokeless Tobacco Never Used     Family History   Problem Relation Age of Onset    Hypertension Mother  Diabetes Mother     Uterine cancer Mother     Hypertension Father     Diabetes Father     No Known Problems Sister     No Known Problems Maternal Grandfather     Colon cancer Paternal Grandmother     Lung cancer Paternal Grandfather     No Known Problems Maternal Aunt     No Known Problems Maternal Aunt     Breast cancer Maternal Aunt         Early 42's    No Known Problems Paternal Aunt     No Known Problems Paternal Aunt        Meds/Allergies     (Not in a hospital admission)      No Known Allergies    Objective     /60   Pulse 96   Temp 98 9 °F (37 2 °C) (Tympanic)   Resp 18   Ht 5' 3" (1 6 m)   Wt 86 6 kg (191 lb)   LMP 09/22/2015   SpO2 97%   Breastfeeding No   BMI 33 83 kg/m²       PHYSICAL EXAM    Gen: NAD  CV: RRR  CHEST: Clear  ABD: soft, NT/ND  EXT: no edema    [facility] Outpatient Visit on 09/22/2020   Component Date Value Ref Range Status    EXT Preg Test, Ur 09/22/2020 Negative  Negative Final    Control 09/22/2020 Valid  Valid Final        RADIOLOGY  Procedure: Fl Barium Swallow W Air Contrast    Result Date: 9/2/2020  Narrative: BARIUM SWALLOW-ESOPHAGRAM INDICATION:   R13 10: Dysphagia, unspecified  COMPARISON:  None IMAGES:  14 fluoroscopic spot views FLUOROSCOPY TIME:   1 7 min  TECHNIQUE: The patient was given effervescent granules and barium by mouth and images of the esophagus were obtained  FINDINGS: The esophagus is normal in caliber  Esophageal motility is normal and emptying of contrast from the esophagus is prompt  No mucosal lesion, ulceration or evidence of fold thickening is seen  Barium tablet passed readily without delay  Gastroesophageal reflux was not observed  Small reducible hiatal hernia with Schatzki's ring  Impression: Small reducible hiatal hernia without gastroesophageal reflux  Unremarkable esophagram otherwise   Workstation performed: [id number]          ASSESSMENT/PLAN:  This is a 40y o  year old female here for EGD to further evaluate dysphagia, and she is stable and optimized for her procedure

## 2020-09-22 NOTE — ANESTHESIA POSTPROCEDURE EVALUATION
Post-Op Assessment Note    CV Status:  Stable  Pain Score: 0    Pain management: adequate     Mental Status:  Alert   Hydration Status:  Stable   PONV Controlled:  Controlled   Airway Patency:  Patent      Post Op Vitals Reviewed: Yes      Staff: CRNA         No complications documented      BP      Temp      Pulse     Resp      SpO2

## 2020-09-23 ENCOUNTER — OFFICE VISIT (OUTPATIENT)
Dept: FAMILY MEDICINE CLINIC | Facility: CLINIC | Age: 45
End: 2020-09-23
Payer: COMMERCIAL

## 2020-09-23 ENCOUNTER — APPOINTMENT (OUTPATIENT)
Dept: RADIOLOGY | Facility: CLINIC | Age: 45
End: 2020-09-23
Payer: COMMERCIAL

## 2020-09-23 VITALS
DIASTOLIC BLOOD PRESSURE: 78 MMHG | TEMPERATURE: 97.9 F | WEIGHT: 194.2 LBS | HEART RATE: 78 BPM | BODY MASS INDEX: 34.4 KG/M2 | SYSTOLIC BLOOD PRESSURE: 120 MMHG | OXYGEN SATURATION: 97 %

## 2020-09-23 DIAGNOSIS — M54.6 ACUTE RIGHT-SIDED THORACIC BACK PAIN: ICD-10-CM

## 2020-09-23 DIAGNOSIS — R07.9 CHEST PAIN, UNSPECIFIED TYPE: Primary | ICD-10-CM

## 2020-09-23 DIAGNOSIS — R07.9 CHEST PAIN, UNSPECIFIED TYPE: ICD-10-CM

## 2020-09-23 PROCEDURE — 71101 X-RAY EXAM UNILAT RIBS/CHEST: CPT

## 2020-09-23 PROCEDURE — 99213 OFFICE O/P EST LOW 20 MIN: CPT | Performed by: FAMILY MEDICINE

## 2020-09-23 PROCEDURE — 72072 X-RAY EXAM THORAC SPINE 3VWS: CPT

## 2020-09-23 RX ORDER — CELECOXIB 100 MG/1
100 CAPSULE ORAL 2 TIMES DAILY
Qty: 30 CAPSULE | Refills: 0 | Status: SHIPPED | OUTPATIENT
Start: 2020-09-23 | End: 2020-10-07 | Stop reason: HOSPADM

## 2020-09-23 NOTE — PROGRESS NOTES
Assessment/Plan:    Chest pain  A chest x-ray and x-ray of the right ribs has been ordered  I also ordered labs, including a D-dimer  I do feel that her pain is musculoskeletal in etiology  Although she was experiencing some right upper quadrant abdominal pain as well, this seems to be related to the right anterior rib pain  Her symptoms are not related to food  As such, I have not ordered an ultrasound of the abdomen at this time  Acute right-sided thoracic back pain  I am going to give the patient a trial of Celebrex 100 mg b i d  With food  Given her history of dysphagia and gastroesophageal reflux, I do not want her to take NSAIDs  Celebrex should be okay as a Correa 2 inhibitor, because it does not affect prostaglandins  We will use it short-term  I will have her take 1 capsule twice a day with food for 15 days  I scheduled the patient a short-term follow-up visit  Diagnoses and all orders for this visit:    Chest pain, unspecified type  -     D-dimer, quantitative; Future  -     CBC and differential; Future  -     Comprehensive metabolic panel; Future  -     XR ribs right w pa chest min 3 views; Future    Acute right-sided thoracic back pain  -     XR spine thoracic 3 vw; Future  -     celecoxib (CeleBREX) 100 mg capsule; Take 1 capsule (100 mg total) by mouth 2 (two) times a day for 15 days Take with food          Subjective:      Patient ID: Han Marina is a 40 y o  female  This is a 57-year-old white female who presents to the office today complaining of pain in her right ribs  She tells me this pain began over the weekend  She denies any trauma  The pain seems to be in a right rib cage but she also gets pain in the right upper quadrant of her abdomen  Pain radiates into the shoulder and into her back  Pain is not constant  It comes and goes  She feels bloated but does not feel any relationship of this pain to food  She denies any shortness of breath    She denies any leg pain or leg swelling  She did try taking some over-the-counter NSAIDs which did not help  The following portions of the patient's history were reviewed and updated as appropriate: allergies, current medications, past family history, past medical history, past social history, past surgical history and problem list     Review of Systems   HENT: Positive for trouble swallowing  Respiratory: Negative for cough and shortness of breath  Cardiovascular: Negative for chest pain, palpitations and leg swelling  Gastrointestinal:        Positive for heartburn         Objective:      /78 (BP Location: Left arm, Patient Position: Sitting, Cuff Size: Large)   Pulse 78   Temp 97 9 °F (36 6 °C) (Tympanic)   Wt 88 1 kg (194 lb 3 2 oz)   SpO2 97%   BMI 34 40 kg/m²          Physical Exam  Vitals signs reviewed  Constitutional:       Comments: This patient is a 61-year-old white female who appears her stated age  She is pleasant, cooperative, and in no distress   HENT:      Head: Normocephalic and atraumatic  Right Ear: Tympanic membrane, ear canal and external ear normal       Left Ear: Tympanic membrane, ear canal and external ear normal       Mouth/Throat:      Mouth: Mucous membranes are moist       Pharynx: Oropharynx is clear  No oropharyngeal exudate or posterior oropharyngeal erythema  Eyes:      General: No scleral icterus  Right eye: No discharge  Left eye: No discharge  Conjunctiva/sclera: Conjunctivae normal       Pupils: Pupils are equal, round, and reactive to light  Neck:      Musculoskeletal: Neck supple  Comments: No thyromegaly  Cardiovascular:      Rate and Rhythm: Normal rate and regular rhythm  Heart sounds: Normal heart sounds  No murmur  No friction rub  No gallop  Pulmonary:      Effort: No respiratory distress  Breath sounds: Normal breath sounds  No stridor  No wheezing, rhonchi or rales        Comments: Negative for pleuritic friction rub  Chest:      Chest wall: Tenderness (Tenderness is noted to palpation over the lower right rib cage anteriorly) present  Abdominal:      General: Bowel sounds are normal  There is no distension  Palpations: Abdomen is soft  There is no mass  Tenderness: There is abdominal tenderness  There is no guarding  Hernia: No hernia is present  Comments: There was tenderness noted to palpation in the right upper quadrant of the abdomen   Musculoskeletal:         General: No swelling or deformity  Comments: There is some tenderness noted to palpation along the right paraspinal thoracic musculature   Lymphadenopathy:      Cervical: No cervical adenopathy  extremities:  Without cyanosis, clubbing, or edema  No calf tenderness was noted  Homans sign is negative

## 2020-09-23 NOTE — ASSESSMENT & PLAN NOTE
A chest x-ray and x-ray of the right ribs has been ordered  I also ordered labs, including a D-dimer  I do feel that her pain is musculoskeletal in etiology  Although she was experiencing some right upper quadrant abdominal pain as well, this seems to be related to the right anterior rib pain  Her symptoms are not related to food  As such, I have not ordered an ultrasound of the abdomen at this time

## 2020-09-23 NOTE — ASSESSMENT & PLAN NOTE
I am going to give the patient a trial of Celebrex 100 mg b i d  With food  Given her history of dysphagia and gastroesophageal reflux, I do not want her to take NSAIDs  Celebrex should be okay as a Correa 2 inhibitor, because it does not affect prostaglandins  We will use it short-term  I will have her take 1 capsule twice a day with food for 15 days  I scheduled the patient a short-term follow-up visit

## 2020-09-25 DIAGNOSIS — I10 HYPERTENSION, UNSPECIFIED TYPE: ICD-10-CM

## 2020-09-25 RX ORDER — PROPRANOLOL HCL 60 MG
CAPSULE, EXTENDED RELEASE 24HR ORAL
Qty: 90 CAPSULE | Refills: 0 | Status: SHIPPED | OUTPATIENT
Start: 2020-09-25 | End: 2020-12-17

## 2020-09-28 DIAGNOSIS — R79.89 ELEVATED D-DIMER: ICD-10-CM

## 2020-09-28 DIAGNOSIS — R07.9 CHEST PAIN, UNSPECIFIED TYPE: Primary | ICD-10-CM

## 2020-09-28 NOTE — RESULT ENCOUNTER NOTE
I informed patient that biopsies of the duodenum were negative for celiac disease  Biopsies of the stomach are benign and negative for H pylori  Biopsies of the esophagus are negative for eosinophilic esophagitis  She is now trying the higher dose of omeprazole 40 mg twice a day to see if it helps with her swallowing  Please arrange for follow-up office visit in about 3 months  I asked her to call if she had any other problems    Thank you

## 2020-09-29 ENCOUNTER — TELEPHONE (OUTPATIENT)
Dept: OTHER | Facility: OTHER | Age: 45
End: 2020-09-29

## 2020-09-29 ENCOUNTER — HOSPITAL ENCOUNTER (OUTPATIENT)
Dept: CT IMAGING | Facility: HOSPITAL | Age: 45
Discharge: HOME | End: 2020-09-29
Attending: FAMILY MEDICINE
Payer: COMMERCIAL

## 2020-09-29 DIAGNOSIS — R79.89 ELEVATED D-DIMER: ICD-10-CM

## 2020-09-29 DIAGNOSIS — R07.9 CHEST PAIN, UNSPECIFIED TYPE: ICD-10-CM

## 2020-09-29 PROCEDURE — 71275 CT ANGIOGRAPHY CHEST: CPT

## 2020-09-29 PROCEDURE — G1004 CDSM NDSC: HCPCS

## 2020-09-29 RX ADMIN — IOHEXOL 85 ML: 350 INJECTION, SOLUTION INTRAVENOUS at 18:15

## 2020-09-29 NOTE — TELEPHONE ENCOUNTER
Alex Reveles is requesting call back of STAT CT Results   The results will also be in her charts for you to review

## 2020-09-30 DIAGNOSIS — D41.01 NEOPLASM OF UNCERTAIN BEHAVIOR OF RIGHT KIDNEY: Primary | ICD-10-CM

## 2020-10-01 ENCOUNTER — HOSPITAL ENCOUNTER (OUTPATIENT)
Dept: ULTRASOUND IMAGING | Facility: HOSPITAL | Age: 45
Discharge: HOME | End: 2020-10-01
Payer: COMMERCIAL

## 2020-10-01 DIAGNOSIS — D41.01 NEOPLASM OF UNCERTAIN BEHAVIOR OF RIGHT KIDNEY: ICD-10-CM

## 2020-10-01 PROCEDURE — 76770 US EXAM ABDO BACK WALL COMP: CPT

## 2020-10-07 ENCOUNTER — OFFICE VISIT (OUTPATIENT)
Dept: FAMILY MEDICINE CLINIC | Facility: CLINIC | Age: 45
End: 2020-10-07
Payer: COMMERCIAL

## 2020-10-07 VITALS
BODY MASS INDEX: 38.22 KG/M2 | HEIGHT: 60 IN | DIASTOLIC BLOOD PRESSURE: 62 MMHG | OXYGEN SATURATION: 97 % | HEART RATE: 81 BPM | TEMPERATURE: 97.2 F | WEIGHT: 194.7 LBS | SYSTOLIC BLOOD PRESSURE: 118 MMHG

## 2020-10-07 DIAGNOSIS — D41.01 NEOPLASM OF UNCERTAIN BEHAVIOR OF RIGHT KIDNEY: ICD-10-CM

## 2020-10-07 DIAGNOSIS — R07.89 MUSCULOSKELETAL CHEST PAIN: Primary | ICD-10-CM

## 2020-10-07 PROCEDURE — 99213 OFFICE O/P EST LOW 20 MIN: CPT | Performed by: FAMILY MEDICINE

## 2020-10-07 PROCEDURE — 3078F DIAST BP <80 MM HG: CPT | Performed by: FAMILY MEDICINE

## 2020-10-07 PROCEDURE — 1036F TOBACCO NON-USER: CPT | Performed by: FAMILY MEDICINE

## 2020-10-07 RX ORDER — METHOCARBAMOL 750 MG/1
750 TABLET, FILM COATED ORAL 3 TIMES DAILY
Qty: 90 TABLET | Refills: 0 | Status: SHIPPED | OUTPATIENT
Start: 2020-10-07 | End: 2021-01-04

## 2020-10-15 ENCOUNTER — HOSPITAL ENCOUNTER (OUTPATIENT)
Dept: CT IMAGING | Facility: HOSPITAL | Age: 45
Discharge: HOME | End: 2020-10-15
Attending: FAMILY MEDICINE
Payer: COMMERCIAL

## 2020-10-15 DIAGNOSIS — D41.01 NEOPLASM OF UNCERTAIN BEHAVIOR OF RIGHT KIDNEY: ICD-10-CM

## 2020-10-15 PROCEDURE — 74178 CT ABD&PLV WO CNTR FLWD CNTR: CPT

## 2020-10-15 PROCEDURE — G1004 CDSM NDSC: HCPCS

## 2020-10-15 RX ADMIN — IOHEXOL 100 ML: 350 INJECTION, SOLUTION INTRAVENOUS at 16:58

## 2020-10-22 ENCOUNTER — TELEPHONE (OUTPATIENT)
Dept: FAMILY MEDICINE CLINIC | Facility: CLINIC | Age: 45
End: 2020-10-22

## 2020-12-08 ENCOUNTER — VBI (OUTPATIENT)
Dept: ADMINISTRATIVE | Facility: OTHER | Age: 45
End: 2020-12-08

## 2020-12-17 DIAGNOSIS — I10 HYPERTENSION, UNSPECIFIED TYPE: ICD-10-CM

## 2020-12-17 RX ORDER — PROPRANOLOL HCL 60 MG
CAPSULE, EXTENDED RELEASE 24HR ORAL
Qty: 90 CAPSULE | Refills: 0 | Status: SHIPPED | OUTPATIENT
Start: 2020-12-17 | End: 2021-03-17

## 2021-01-02 DIAGNOSIS — R07.89 MUSCULOSKELETAL CHEST PAIN: ICD-10-CM

## 2021-01-04 RX ORDER — METHOCARBAMOL 750 MG/1
750 TABLET, FILM COATED ORAL 3 TIMES DAILY
Qty: 90 TABLET | Refills: 0 | Status: SHIPPED | OUTPATIENT
Start: 2021-01-04 | End: 2021-06-07 | Stop reason: SDUPTHER

## 2021-01-20 DIAGNOSIS — F32.0 MAJOR DEPRESSIVE DISORDER, SINGLE EPISODE, MILD (HCC): ICD-10-CM

## 2021-01-20 DIAGNOSIS — R60.9 EDEMA, UNSPECIFIED TYPE: ICD-10-CM

## 2021-01-20 RX ORDER — BUPROPION HYDROCHLORIDE 150 MG/1
TABLET, EXTENDED RELEASE ORAL
Qty: 180 TABLET | Refills: 1 | Status: SHIPPED | OUTPATIENT
Start: 2021-01-20 | End: 2021-07-09 | Stop reason: SDUPTHER

## 2021-01-20 RX ORDER — FUROSEMIDE 20 MG/1
20 TABLET ORAL AS NEEDED
Qty: 90 TABLET | Refills: 2 | Status: SHIPPED | OUTPATIENT
Start: 2021-01-20 | End: 2021-10-29 | Stop reason: SDUPTHER

## 2021-01-28 ENCOUNTER — OFFICE VISIT (OUTPATIENT)
Dept: FAMILY MEDICINE CLINIC | Facility: CLINIC | Age: 46
End: 2021-01-28
Payer: COMMERCIAL

## 2021-01-28 VITALS
HEART RATE: 80 BPM | BODY MASS INDEX: 38.11 KG/M2 | WEIGHT: 194.1 LBS | SYSTOLIC BLOOD PRESSURE: 128 MMHG | DIASTOLIC BLOOD PRESSURE: 78 MMHG | HEIGHT: 60 IN | TEMPERATURE: 97.6 F | OXYGEN SATURATION: 98 %

## 2021-01-28 DIAGNOSIS — R53.83 OTHER FATIGUE: ICD-10-CM

## 2021-01-28 DIAGNOSIS — I10 ESSENTIAL HYPERTENSION: Primary | ICD-10-CM

## 2021-01-28 DIAGNOSIS — E78.5 DYSLIPIDEMIA: ICD-10-CM

## 2021-01-28 DIAGNOSIS — G43.009 MIGRAINE WITHOUT AURA AND WITHOUT STATUS MIGRAINOSUS, NOT INTRACTABLE: ICD-10-CM

## 2021-01-28 PROCEDURE — 3725F SCREEN DEPRESSION PERFORMED: CPT | Performed by: FAMILY MEDICINE

## 2021-01-28 PROCEDURE — 3078F DIAST BP <80 MM HG: CPT | Performed by: FAMILY MEDICINE

## 2021-01-28 PROCEDURE — 1036F TOBACCO NON-USER: CPT | Performed by: FAMILY MEDICINE

## 2021-01-28 PROCEDURE — 3074F SYST BP LT 130 MM HG: CPT | Performed by: FAMILY MEDICINE

## 2021-01-28 PROCEDURE — 99213 OFFICE O/P EST LOW 20 MIN: CPT | Performed by: FAMILY MEDICINE

## 2021-01-28 PROCEDURE — 3008F BODY MASS INDEX DOCD: CPT | Performed by: FAMILY MEDICINE

## 2021-01-28 NOTE — PROGRESS NOTES
Assessment/Plan:    Hypertension   Patient has hypertension  Her blood pressure is well controlled  I recheck her blood pressure and found to be 124/78  Patient will continue propranolol XL, which is also being used for migraine prophylaxis    Migraine without aura and without status migrainosus, not intractable   Migraines are currently stable  She will continue propranolol XL       Diagnoses and all orders for this visit:    Essential hypertension    Dyslipidemia  -     Lipid panel; Future    Other fatigue  -     Basic metabolic panel; Future  -     TSH, 3rd generation with Free T4 reflex; Future    Migraine without aura and without status migrainosus, not intractable          Subjective:      Patient ID: Vivien Robles is a 39 y o  female  This patient is a 59-year-old white female who presents to the office today for her routine checkup  The patient has no complaints  She reports compliance with her medication  She continues to struggle with her diet  She tells me she return to work 3 weeks ago  Prior to that, she had been working from home remotely  Migraines have been well controlled  Last headache was in October and she tells me that was very mild and she was able to get rid of it easily  The following portions of the patient's history were reviewed and updated as appropriate: allergies, current medications, past family history, past medical history, past social history, past surgical history and problem list     Review of Systems      Objective:      /78 (BP Location: Left arm, Patient Position: Sitting, Cuff Size: Large)   Pulse 80   Temp 97 6 °F (36 4 °C) (Temporal)   Ht 5' (1 524 m)   Wt 88 kg (194 lb 1 6 oz)   SpO2 98%   BMI 37 91 kg/m²          Physical Exam  Vitals signs reviewed  Constitutional:       Comments: This is a 59-year-old white female who appears her stated age  She is pleasant, cooperative, and in no distress   HENT:      Head: Normocephalic and atraumatic  Right Ear: Tympanic membrane, ear canal and external ear normal       Left Ear: Tympanic membrane, ear canal and external ear normal       Mouth/Throat:      Mouth: Mucous membranes are moist       Pharynx: Oropharynx is clear  No oropharyngeal exudate or posterior oropharyngeal erythema  Eyes:      General: No scleral icterus  Right eye: No discharge  Left eye: No discharge  Conjunctiva/sclera: Conjunctivae normal       Pupils: Pupils are equal, round, and reactive to light  Neck:      Musculoskeletal: Neck supple  Comments: No thyromegaly  Cardiovascular:      Rate and Rhythm: Normal rate and regular rhythm  Heart sounds: Normal heart sounds  No murmur  No friction rub  No gallop  Pulmonary:      Effort: Pulmonary effort is normal  No respiratory distress  Breath sounds: Normal breath sounds  No stridor  No wheezing, rhonchi or rales  Abdominal:      General: Bowel sounds are normal  There is no distension  Palpations: Abdomen is soft  There is no mass  Tenderness: There is no abdominal tenderness  There is no guarding  Comments: There is no hepatosplenomegaly   Lymphadenopathy:      Cervical: No cervical adenopathy  Psychiatric:         Mood and Affect: Mood normal          Behavior: Behavior normal          Thought Content:  Thought content normal          Judgment: Judgment normal          Extremities: Without cyanosis, clubbing, or edema

## 2021-01-28 NOTE — ASSESSMENT & PLAN NOTE
Patient has hypertension  Her blood pressure is well controlled  I recheck her blood pressure and found to be 124/78    Patient will continue propranolol XL, which is also being used for migraine prophylaxis

## 2021-03-17 DIAGNOSIS — I10 HYPERTENSION, UNSPECIFIED TYPE: ICD-10-CM

## 2021-03-17 RX ORDER — PROPRANOLOL HCL 60 MG
CAPSULE, EXTENDED RELEASE 24HR ORAL
Qty: 90 CAPSULE | Refills: 1 | Status: SHIPPED | OUTPATIENT
Start: 2021-03-17 | End: 2021-10-06 | Stop reason: SDUPTHER

## 2021-04-01 DIAGNOSIS — Z23 ENCOUNTER FOR IMMUNIZATION: ICD-10-CM

## 2021-04-27 ENCOUNTER — TELEPHONE (OUTPATIENT)
Dept: FAMILY MEDICINE CLINIC | Facility: CLINIC | Age: 46
End: 2021-04-27

## 2021-06-07 ENCOUNTER — OFFICE VISIT (OUTPATIENT)
Dept: FAMILY MEDICINE CLINIC | Facility: CLINIC | Age: 46
End: 2021-06-07
Payer: COMMERCIAL

## 2021-06-07 VITALS
BODY MASS INDEX: 38.79 KG/M2 | OXYGEN SATURATION: 97 % | DIASTOLIC BLOOD PRESSURE: 78 MMHG | HEIGHT: 60 IN | HEART RATE: 73 BPM | WEIGHT: 197.6 LBS | TEMPERATURE: 97.9 F | SYSTOLIC BLOOD PRESSURE: 122 MMHG

## 2021-06-07 DIAGNOSIS — S29.019A THORACIC MYOFASCIAL STRAIN, INITIAL ENCOUNTER: Primary | ICD-10-CM

## 2021-06-07 DIAGNOSIS — R07.89 MUSCULOSKELETAL CHEST PAIN: ICD-10-CM

## 2021-06-07 PROCEDURE — 3008F BODY MASS INDEX DOCD: CPT | Performed by: FAMILY MEDICINE

## 2021-06-07 PROCEDURE — 99213 OFFICE O/P EST LOW 20 MIN: CPT | Performed by: FAMILY MEDICINE

## 2021-06-07 PROCEDURE — 1036F TOBACCO NON-USER: CPT | Performed by: FAMILY MEDICINE

## 2021-06-07 RX ORDER — METHOCARBAMOL 750 MG/1
750 TABLET, FILM COATED ORAL 3 TIMES DAILY
Qty: 90 TABLET | Refills: 0 | Status: SHIPPED | OUTPATIENT
Start: 2021-06-07

## 2021-06-07 NOTE — ASSESSMENT & PLAN NOTE
Patient has a thoracic strain  I refilled her prescription for methocarbamol  She may take Aleve but only with food  I asked her to use moist heat to the affected area  I referred the patient for physical therapy  I reviewed her previous x-rays from last   Autumn  I scheduled the patient a follow-up visit to see me in 4 weeks

## 2021-06-07 NOTE — PROGRESS NOTES
Assessment/Plan:    Thoracic myofascial strain   Patient has a thoracic strain  I refilled her prescription for methocarbamol  She may take Aleve but only with food  I asked her to use moist heat to the affected area  I referred the patient for physical therapy  I reviewed her previous x-rays from last   Autumn  I scheduled the patient a follow-up visit to see me in 4 weeks  Diagnoses and all orders for this visit:    Thoracic myofascial strain, initial encounter  -     methocarbamol (ROBAXIN) 750 mg tablet; Take 1 tablet (750 mg total) by mouth 3 (three) times a day  -     Ambulatory referral to Physical Therapy; Future    Musculoskeletal chest pain          Subjective:      Patient ID: Remberto Goetz is a 39 y o  female  This is a 28-year-old white female presents to the office today complaining of pain in her mid back  The pain is between her shoulder blades and radiates around her ribs bilaterally  She reports she was experiencing pain in her ribcage last week but yesterday, she tells me she picked up a bag of potting soil and this is what really caused her pain  The pain is such that it takes her breath away  She took Aleve last night before she went to bed with partial relief of her symptoms  She had similar symptoms last fall  The following portions of the patient's history were reviewed and updated as appropriate: allergies, current medications, past family history, past medical history, past social history, past surgical history and problem list     Review of Systems   Respiratory: Negative for cough, shortness of breath and wheezing  Cardiovascular: Negative for chest pain, palpitations and leg swelling  Objective:      /78 (BP Location: Left arm, Patient Position: Sitting, Cuff Size: Adult)   Pulse 73   Temp 97 9 °F (36 6 °C) (Temporal)   Ht 5' (1 524 m)   Wt 89 6 kg (197 lb 9 6 oz)   SpO2 97%   BMI 38 59 kg/m²          Physical Exam  Vitals signs reviewed  Constitutional:       Comments:   Patient is a 28-year-old white female who appears her stated age  She was cooperative and in no apparent distress   HENT:      Head: Normocephalic and atraumatic  Right Ear: Tympanic membrane, ear canal and external ear normal  There is no impacted cerumen  Left Ear: Tympanic membrane, ear canal and external ear normal  There is no impacted cerumen  Mouth/Throat:      Mouth: Mucous membranes are moist       Pharynx: Oropharynx is clear  No oropharyngeal exudate or posterior oropharyngeal erythema  Eyes:      General: No scleral icterus  Right eye: No discharge  Left eye: No discharge  Conjunctiva/sclera: Conjunctivae normal       Pupils: Pupils are equal, round, and reactive to light  Neck:      Musculoskeletal: Neck supple  Cardiovascular:      Rate and Rhythm: Normal rate and regular rhythm  Heart sounds: Normal heart sounds  No murmur  No friction rub  No gallop  Pulmonary:      Effort: Pulmonary effort is normal  No respiratory distress  Breath sounds: Normal breath sounds  No stridor  No wheezing, rhonchi or rales  Abdominal:      General: Bowel sounds are normal  There is no distension  Palpations: Abdomen is soft  There is no mass  Tenderness: There is no abdominal tenderness  There is no guarding  Comments: There is no thyromegaly noted   Musculoskeletal:      Comments:  Full cervical range of motion noted  Full lumbar range of motion noted  There was some tenderness noted to palpation in the bilateral paraspinal thoracic musculature, right more so than left  This seemed to be in the area between T3 and T7  I did not detect any muscle spasm  Lymphadenopathy:      Cervical: No cervical adenopathy  Neurological:      Comments:   Deep tendon reflexes are 2/4  Motor strength is 5/5   strength was somewhat diminished on the right    Tinel sign was negative at the wrist   Cervical compression test is negative   Psychiatric:         Mood and Affect: Mood normal          Behavior: Behavior normal          Thought Content:  Thought content normal          Judgment: Judgment normal

## 2021-06-24 ENCOUNTER — EVALUATION (OUTPATIENT)
Dept: PHYSICAL THERAPY | Facility: HOME HEALTHCARE | Age: 46
End: 2021-06-24
Payer: COMMERCIAL

## 2021-06-24 DIAGNOSIS — S29.019A THORACIC MYOFASCIAL STRAIN, INITIAL ENCOUNTER: ICD-10-CM

## 2021-06-24 DIAGNOSIS — M54.16 LUMBAR RADICULOPATHY: Primary | ICD-10-CM

## 2021-06-24 PROCEDURE — 97112 NEUROMUSCULAR REEDUCATION: CPT | Performed by: PHYSICAL THERAPIST

## 2021-06-24 PROCEDURE — 97110 THERAPEUTIC EXERCISES: CPT | Performed by: PHYSICAL THERAPIST

## 2021-06-24 PROCEDURE — 97162 PT EVAL MOD COMPLEX 30 MIN: CPT | Performed by: PHYSICAL THERAPIST

## 2021-06-24 NOTE — PROGRESS NOTES
PT Evaluation     Today's date: 2021  Patient name: Tello Ramirez  : 1975  MRN: [id number]  Referring provider: Bi Hidalgo DO  Dx:   Encounter Diagnosis     ICD-10-CM    1  Lumbar radiculopathy  M54 16    2  Thoracic myofascial strain, initial encounter  S29 019A Ambulatory referral to Physical Therapy                  Assessment  Assessment details: Pt Vee Jenkins is a 39 y o  y o  who presents to OPPT with s/s consistent with chronic lower and mid back pain  Pt presents with limited lumbar and thoracic mobility and core strength deficits, decreased R UE and B LE strength, impaired soft tissue mobility/limited flexibility, decreased postural awareness, and gait/balance dysfunctions  Pt with limitations with prolonged ambulation, difficulty with stair negotiation, disrupted sleep patterns, and difficulty with lifting/carrying  Pt has become more sedentary with limitations completing daily activities at home due to increased pain back with functional tasks  Pt would benefit from skilled therapy services to address outlined impairments, work towards goals, and restore pts PLOF  Thank you!   Impairments: abnormal gait, abnormal or restricted ROM, abnormal movement, activity intolerance, impaired balance, impaired physical strength, lacks appropriate home exercise program, pain with function, weight-bearing intolerance, poor posture  and poor body mechanics  Understanding of Dx/Px/POC: good   Prognosis: good    Goals  STGs to be achieved in 4 weeks:  -Pt to demonstrate reduced subjective pain rating "at worst" by at least 2-3 points from Initial Eval to allow for reduced pain with ADLs and improved functional activity tolerance    -Pt to demonstrate ROM improved to minimal limitations t/o spine in order to maximize joint mobility and function and allow for progression of exercise program and achievement of goals    -Pt to demonstrate increased MMT of BLE by at least 1/2 grade in order to improve safety and stability with ADLs and functional mobility  LTGs to be achieved upon discharge:   -Pt will be I with HEP in order to continue to improve quality of life and independence and reduce risk for re-injury    -Pt to demonstrate return to activities of daily living without limiations or restrictions    -Pt will return to ambulation > 1 hour without symptoms to help facilitate return to community activities independently   -Pt to demonstrate ability to lift/carry > 10# without symptoms for return to household activities  -Pt to demonstrate return to work activities without limitations or restrictions    -Pt to demonstrate improved function as noted by achieving or exceeding predicted score on FOTO outcomes assessment tool  Plan  Plan details: PT provided pt with detailed HEP program; pt verbalized understanding of program    Patient would benefit from: skilled physical therapy  Planned modality interventions: cryotherapy and thermotherapy: hydrocollator packs  Planned therapy interventions: abdominal trunk stabilization, balance, manual therapy, neuromuscular re-education, patient education, postural training, therapeutic activities, therapeutic exercise, home exercise program, flexibility and functional ROM exercises  Frequency: 2x week  Duration in weeks: 4  Plan of Care beginning date: 2021  Plan of Care expiration date: 2021  Treatment plan discussed with: patient        Subjective Evaluation    History of Present Illness  Mechanism of injury: Pt reporting that she first began to have pain last fall and had imaging completed in 2020  She notes no specific injury or incident for onset of pain  She went to PCP who has on her mm relaxers which she takes as needed; medication does provide some relief  She recently saw PCP again who has now referred to OPPT for management of s/s  Return to MD again on 21     Quality of life: good    Pain  At best pain ratin  At worst pain rating: 10  Quality: dull ache and radiating  Relieving factors: rest and medications  Aggravating factors: lifting and sitting    Social Support  Steps to enter house: yes  Stairs in house: yes     Employment status: working  Hand dominance: right      Diagnostic Tests  X-ray: abnormal  Treatments  Previous treatment: chiropractic and medication  Current treatment: physical therapy  Patient Goals  Patient goals for therapy: increased strength, independence with ADLs/IADLs, increased motion, improved balance, decreased pain and decreased edema          Objective     Concurrent Complaints  Positive for disturbed sleep  Negative for dizziness, headaches, bladder dysfunction and bowel dysfunction    Postural Observations  Seated posture: fair  Standing posture: good        Neurological Testing     Sensation     Lumbar   Left   Intact: light touch    Right   Intact: light touch    Active Range of Motion   Cervical/Thoracic Spine       Thoracic    Left rotation:  Restriction level: moderate  Right rotation:  Restriction level: maximal    Lumbar   Flexion:  Restriction level: minimal  Extension:  Restriction level: minimal  Left lateral flexion:  Restriction level: minimal  Right lateral flexion:  Restriction level: minimal    Joint Play     Hypomobile: T3, T4, T5 and T6     Strength/Myotome Testing     Left Hip   Planes of Motion   Flexion: 4  Abduction: 4  Adduction: 4    Right Hip   Planes of Motion   Flexion: 3+  Abduction: 4-  Adduction: 4-    Left Knee   Flexion: 5  Extension: 5    Right Knee   Flexion: 4  Extension: 4    Tests     Lumbar     Left   Negative crossed SLR and passive SLR  Right   Positive passive SLR  Negative crossed SLR       General Comments:      Shoulder Comments   B UE ROM WFL     R UE MMT grossly 4/5  L UE MMT grossly 4+ to 5/5     Hip Comments   B LE ROM WFL   Neuro Exam:     Headaches   Patient reports headaches: No               Re-eval Date: 7/24/21    Date 6/24       Visit Count 1       FOTO Completed             Precautions: None       Manuals 6/24                                       Neuro Re-Ed         MTP/LTP        Pk tband ER        Palloff press         Wall angels with towel behind head                         Postural ed  HZ        Ther Ex        NuStep         Standing hip flex/abd/ext        Squats         Step-ups         PPT        PPT with carlene        LTR        SKTC        TA        SLR        S/L SLR HEP       Clamshells  HEP       Heel walk outs         Quad hip extension        Doorway stretch        Self HS/Piriformis stretch        Small roll static stretch  Thoracic spine   Vert/horizontal   2 min ea dir   HEP                               Ther Activity                        Gait Training                        Modalities

## 2021-06-28 ENCOUNTER — OFFICE VISIT (OUTPATIENT)
Dept: PHYSICAL THERAPY | Facility: HOME HEALTHCARE | Age: 46
End: 2021-06-28
Payer: COMMERCIAL

## 2021-06-28 DIAGNOSIS — M54.16 LUMBAR RADICULOPATHY: ICD-10-CM

## 2021-06-28 DIAGNOSIS — S29.019A THORACIC MYOFASCIAL STRAIN, INITIAL ENCOUNTER: Primary | ICD-10-CM

## 2021-06-28 PROCEDURE — 97112 NEUROMUSCULAR REEDUCATION: CPT

## 2021-06-28 PROCEDURE — 97110 THERAPEUTIC EXERCISES: CPT

## 2021-06-28 NOTE — PROGRESS NOTES
Daily Note     Today's date: 2021  Patient name: Meenakshi Wise  : 1975  MRN: [id number]  Referring provider: Ryan Recinos DO  Dx:   Encounter Diagnosis     ICD-10-CM    1  Thoracic myofascial strain, initial encounter  S29 019A    2  Lumbar radiculopathy  M54 16               Subjective: Pt reports she has no pain right now  Objective: See treatment diary below      Assessment: Tolerated treatment well  Verbal cues needed t/o exercise to perform correctly  No pain reproted t/o session  HS tightness noted B LE with self stretch  Core strength deficits noted with exercise  Patient would benefit from continued PT to improve ROM, strength and overall function  Plan: Continue per plan of care        Re-eval Date: 21    Date 21      Visit Count 1 2      FOTO Completed             Precautions: None       Manuals 21                                      Neuro Re-Ed         MTP/LTP  Green 1 x 20 ea       Pk tband ER  Red 1 x 15       Palloff press         Wall angels with towel behind head                         Postural ed  HZ        Ther Ex        NuStep   L1  10 min       Standing hip flex/abd/ext  1 x 15 ea Pk       Squats   1 x 15       Step-ups   C Fwd  1 x 15 Pk      PPT  5" x 10       PPT with marches        LTR  5" x 10       SKTC  5" x 5       TA        SLR  1 x 15 Pk       S/L SLR HEP       Clamshells  HEP       Heel walk outs         Quad hip extension        Doorway stretch  20" x 4       Self HS/Piriformis stretch  20" x 3 Pk HS      Small roll static stretch  Thoracic spine   Vert/horizontal   2 min ea dir   HEP                               Ther Activity                        Gait Training                        Modalities

## 2021-07-02 ENCOUNTER — RA CDI HCC (OUTPATIENT)
Dept: OTHER | Facility: HOSPITAL | Age: 46
End: 2021-07-02

## 2021-07-02 NOTE — PROGRESS NOTES
[facility]  coding opportunities       Chart reviewed, (number of) suggestions sent to provider: 2           Patients insurance company: [employer] (Medicare Advantage and Commercial)        1) F32 0: Major depressive disorder, single episode, mild ([facility] ) - Please review for current status and assess and document, if applicable - found in active problem list - last assessed on 7/27/2020     2) E66 01: Morbid (severe) obesity due to excess calories ([facility] ) - please review if this is correct and assess and document if applicable       Per CMS/ICD 10 coding guidelines, to be used when BMI > 35 & <40 with one or more comorbidity (DM, HTN, or MITA) - not used -- bmi 37 85

## 2021-07-09 ENCOUNTER — OFFICE VISIT (OUTPATIENT)
Dept: FAMILY MEDICINE CLINIC | Facility: CLINIC | Age: 46
End: 2021-07-09
Payer: COMMERCIAL

## 2021-07-09 VITALS
HEIGHT: 60 IN | WEIGHT: 193.8 LBS | SYSTOLIC BLOOD PRESSURE: 122 MMHG | HEART RATE: 73 BPM | DIASTOLIC BLOOD PRESSURE: 88 MMHG | OXYGEN SATURATION: 97 % | TEMPERATURE: 99.8 F | BODY MASS INDEX: 38.05 KG/M2

## 2021-07-09 DIAGNOSIS — Z12.4 SCREENING FOR CERVICAL CANCER: ICD-10-CM

## 2021-07-09 DIAGNOSIS — G43.009 MIGRAINE WITHOUT AURA AND WITHOUT STATUS MIGRAINOSUS, NOT INTRACTABLE: ICD-10-CM

## 2021-07-09 DIAGNOSIS — M72.2 PLANTAR FASCIITIS OF RIGHT FOOT: ICD-10-CM

## 2021-07-09 DIAGNOSIS — S29.019D THORACIC MYOFASCIAL STRAIN, SUBSEQUENT ENCOUNTER: ICD-10-CM

## 2021-07-09 DIAGNOSIS — I10 ESSENTIAL HYPERTENSION: Primary | ICD-10-CM

## 2021-07-09 DIAGNOSIS — F32.0 MAJOR DEPRESSIVE DISORDER, SINGLE EPISODE, MILD (HCC): ICD-10-CM

## 2021-07-09 PROCEDURE — 99214 OFFICE O/P EST MOD 30 MIN: CPT | Performed by: FAMILY MEDICINE

## 2021-07-09 PROCEDURE — 3008F BODY MASS INDEX DOCD: CPT | Performed by: FAMILY MEDICINE

## 2021-07-09 RX ORDER — BUPROPION HYDROCHLORIDE 150 MG/1
150 TABLET, EXTENDED RELEASE ORAL 2 TIMES DAILY
Qty: 180 TABLET | Refills: 1 | Status: SHIPPED | OUTPATIENT
Start: 2021-07-09 | End: 2022-01-17

## 2021-07-09 NOTE — PROGRESS NOTES
Assessment/Plan:    Hypertension    Patient has hypertension  Blood pressure is well controlled  I recheck her blood pressure and found to be 130/82  The patient will continue Inderal LA 60 milligrams daily, which is also being prescribed for migraine headache prophylaxis  I encouraged the patient to follow a low-sodium diet and try to lose weight  Migraine without aura and without status migrainosus, not intractable    Migraines are currently stable on propranolol    Major depressive disorder, single episode, mild ([address] )   Depression currently controlled  I refilled her prescription for Wellbutrin 150 milligrams b i d  Thoracic myofascial strain   Patient is improving with physical therapy  I reviewed physical therapy notes  May continue Robaxin p r n  Plantar fasciitis of right foot   Exercises were given  I also recommended she purchase [employer]'s heel cups from [employer]  I advised the patient that if she has no improvement or if she worsens, she may require referral to Podiatry for injections in her heel  Diagnoses and all orders for this visit:    Essential hypertension    Screening for cervical cancer  -     Ambulatory referral to Obstetrics / Gynecology; Future    Major depressive disorder, single episode, mild ([facility])  -     buPROPion (WELLBUTRIN SR) 150 mg 12 hr tablet; Take 1 tablet (150 mg total) by mouth 2 (two) times a day    Migraine without aura and without status migrainosus, not intractable    Thoracic myofascial strain, subsequent encounter    Plantar fasciitis of right foot        BMI Counseling: Body mass index is 37 85 kg/m²  The BMI is above normal  Nutrition recommendations include reducing portion sizes, decreasing overall calorie intake, 3-5 servings of fruits/vegetables daily, reducing fast food intake, consuming healthier snacks, decreasing soda and/or juice intake and moderation in carbohydrate intake   Exercise recommendations include exercising 3-5 times per week     Subjective:      Patient ID: eJrilyn Avila is a 39 y o  female  This patient is a 70-year-old white female who presents to the office today for her routine checkup  The patient reports that she has been experiencing right thoracic pain  She tells me it is improving  She has been going for physical therapy  She complains of pain in the plantar aspect of her right heel  She reports compliance with her medication  She is trying to lose weight  The following portions of the patient's history were reviewed and updated as appropriate: allergies, current medications, past family history, past medical history, past social history, past surgical history and problem list     Review of Systems   Respiratory: Negative for cough and shortness of breath  Cardiovascular: Negative for chest pain, palpitations and leg swelling  Gastrointestinal: Negative for abdominal distention, abdominal pain, blood in stool, constipation, diarrhea and nausea  Musculoskeletal: Positive for back pain  Positive for right heel pain   Neurological: Negative for headaches  Psychiatric/Behavioral: Negative for dysphoric mood and sleep disturbance  The patient is not nervous/anxious  Objective:      /88 (BP Location: Left arm, Patient Position: Sitting, Cuff Size: Adult)   Pulse 73   Temp 99 8 °F (37 7 °C) (Tympanic)   Ht 5' (1 524 m)   Wt 87 9 kg (193 lb 12 8 oz)   SpO2 97%   BMI 37 85 kg/m²          Physical Exam  Vitals reviewed  Constitutional:       Comments: This is a 70-year-old white female who appears her stated age  She is pleasant, cooperative, and in no distress   HENT:      Head: Normocephalic and atraumatic  Right Ear: Tympanic membrane, ear canal and external ear normal  There is no impacted cerumen  Left Ear: Tympanic membrane, ear canal and external ear normal  There is no impacted cerumen        Mouth/Throat:      Mouth: Mucous membranes are moist       Pharynx: Oropharynx is clear  No oropharyngeal exudate or posterior oropharyngeal erythema  Eyes:      General: No scleral icterus  Right eye: No discharge  Left eye: No discharge  Conjunctiva/sclera: Conjunctivae normal       Pupils: Pupils are equal, round, and reactive to light  Neck:      Comments: There is no thyromegaly  Cardiovascular:      Rate and Rhythm: Normal rate and regular rhythm  Heart sounds: Normal heart sounds  No murmur heard  No friction rub  No gallop  Pulmonary:      Effort: Pulmonary effort is normal  No respiratory distress  Breath sounds: Normal breath sounds  No stridor  No wheezing, rhonchi or rales  Abdominal:      General: Bowel sounds are normal  There is no distension  Palpations: Abdomen is soft  There is no mass  Tenderness: There is no abdominal tenderness  There is no guarding  Comments:  No organomegaly was noted   Musculoskeletal:      Cervical back: Neck supple  Comments: There was mild tenderness to palpation over the plantar aspect of the right heel  No tenderness to palpation over the Achilles tendon  Casanova's test was negative  No tenderness to palpation in the paraspinal thoracic or lumbar musculature  Lymphadenopathy:      Cervical: No cervical adenopathy  Psychiatric:         Mood and Affect: Mood normal          Behavior: Behavior normal          Thought Content:  Thought content normal          Judgment: Judgment normal

## 2021-07-09 NOTE — PATIENT INSTRUCTIONS
Plantar Fasciitis Exercises   AMBULATORY CARE:   What you need to know about plantar fasciitis exercises:  Plantar fasciitis exercises help stretch your plantar fascia, calf muscles, and Achilles tendon  They also help strengthen the muscles that support your heel and foot  Exercises and stretching can help prevent plantar fasciitis from getting worse or coming back  Contact your healthcare provider if:   · Your pain and swelling increase  · You develop new knee, hip, or back pain  · You have questions or concerns about your condition or care  How to do plantar fasciitis exercises:  Ask your healthcare provider when to start these exercises and how often to do them  · Slant board stretch:  Stand on a slanted board with your toes higher than your heel  Press your heel into the board  Keep your knee slightly bent  Hold this position for 1 minute  Repeat 5 times  · Heel stretch:  Stand up straight with your hands on a wall  Place your injured leg slightly behind your other leg  Keep your heels flat on the floor, lean forward, and bend both knees  Hold for 30 seconds  · Calf stretch:  Stand up straight with your hands on a wall  Step forward so that your uninjured foot is in front of your injured foot  Keep your front leg bent and your back leg straight  Gently lean forward until you feel your calf stretch  Hold for 30 seconds and then relax  · Seated plantar fascia stretch:  Sit on a firm surface, such as the floor or a mat  Extend your legs out in front of you  Raise your injured foot a few inches off the ground  Keep your leg straight  Grab the toes of your injured foot and pull them toward you  With your other hand, feel your plantar fascia  You should feel it press outward  Hold for 30 seconds  If you cannot reach your toes, loop a towel or tie around your foot  Gently pull on the towel or tie and flex your toes toward you  · Heel raises:  Stand on the injured leg   Raise your other leg off the ground  Hold onto a railing or wall for balance  Slowly rise up on the toes of your injured leg  Hold for 5 seconds  Slowly lower your heel to the ground  · Toe curls:  Place a towel on the floor  Put your foot flat on the towel  Grab the towel with your toes by curling them around the towel  Lift the towel up with your toes  · Toe taps:  Sit down and place your foot flat on the floor  Keep your heel on the floor  Point all your toes up toward the ceiling  While the 4 smaller toes are pointed up, bend your big toe down and tap it on the ground  Do 10 to 50 taps  Point all 5 toes up toward the ceiling again  This time keep your big toe pointed up and tap the 4 smaller toes on the ground  Do 10 to 50 taps each time  Follow up with your healthcare provider as directed:  Write down your questions so you remember to ask them during your visits  © Copyright [address] Information is for End User's use only and may not be sold, redistributed or otherwise used for commercial purposes  All illustrations and images included in CareNotes® are the copyrighted property of [employer]  or [facility] Travis Rocha   The above information is an  only  It is not intended as medical advice for individual conditions or treatments  Talk to your doctor, nurse or pharmacist before following any medical regimen to see if it is safe and effective for you

## 2021-07-10 ENCOUNTER — TELEPHONE (OUTPATIENT)
Dept: ADMINISTRATIVE | Facility: OTHER | Age: 46
End: 2021-07-10

## 2021-07-10 NOTE — TELEPHONE ENCOUNTER
Upon review of the In Basket request we were able to locate, review, and update the patient chart as requested for Pap Smear (HPV) aka Cervical Cancer Screening  Any additional questions or concerns should be emailed to the Practice Liaisons via [email or web address] email, please do not reply via In Basket      Thank you  Mervat Keene

## 2021-07-10 NOTE — TELEPHONE ENCOUNTER
----- Message from Nadia Nazario MA sent at 7/9/2021 10:57 AM EDT -----  Regarding: cervical screening [facility]  07/09/21 10:58 AM    Hello, our patient Claudy Ennis has had Pap Smear (HPV) aka Cervical Cancer Screening completed/performed  Please assist in updating the patient chart by pulling the Care Everywhere (CE) document  The date of service is 10/30/21       Thank you,  Nadia Nazario MA  [facility] PRIMARY [facility]

## 2021-07-12 ENCOUNTER — OFFICE VISIT (OUTPATIENT)
Dept: PHYSICAL THERAPY | Facility: HOME HEALTHCARE | Age: 46
End: 2021-07-12
Payer: COMMERCIAL

## 2021-07-12 DIAGNOSIS — S29.019A THORACIC MYOFASCIAL STRAIN, INITIAL ENCOUNTER: Primary | ICD-10-CM

## 2021-07-12 PROCEDURE — 97110 THERAPEUTIC EXERCISES: CPT

## 2021-07-12 PROCEDURE — 97112 NEUROMUSCULAR REEDUCATION: CPT

## 2021-07-12 NOTE — PROGRESS NOTES
Daily Note     Today's date: 2021  Patient name: Guerrero Aguilera  : 1975  MRN: [id number]  Referring provider: Rosy Solis DO  Dx: No diagnosis found  Start Time:           Subjective: Radha of 4/10 from sh blades to LB  No pain B LE  Objective: See treatment diary below    Assessment: Tolerated treatment well  Pt was able to complete TE and self stretch with good octavio and without c/o increased pain  She did report some soreness at R hip with standing SLR abd  Patient would benefit from continued PT    Plan: Continue per plan of care        Re-eval Date: 21    Date 21     Visit Count 1 2 3     FOTO Completed             Precautions: None       Manuals 21                                     Neuro Re-Ed         MTP/LTP  Green 1 x 20 ea  Green 1 x 20 ea     Pk tband ER  Red 1 x 15  Red 1 x 15     Palloff press         Wall angels with towel behind head                         Postural ed  HZ        Ther Ex   21     NuStep   L1  10 min  L 2  10'     Standing hip flex/abd/ext  1 x 15 ea Pk  1 x 15 ea Pk     Squats   1 x 15  1 x 15     Step-ups   C Fwd  1 x 15 Pk C Fwd   1 x 15 Pk     PPT  5" x 10  5" x 10     PPT with marches        LTR  5" x 10  5" x 10     SKTC  5" x 5  5" x 5     TA        SLR  1 x 15 Pk  1 x 15 Pk     S/L SLR HEP       Clamshells  HEP       Heel walk outs         Quad hip extension        Doorway stretch  20" x 4  20" x 3     Self HS/Piriformis stretch  20" x 3 Pk HS 20" x 3 B HS      Small roll static stretch  Thoracic spine   Vert/horizontal   2 min ea dir   HEP                               Ther Activity                        Gait Training                        Modalities

## 2021-07-19 ENCOUNTER — OFFICE VISIT (OUTPATIENT)
Dept: PHYSICAL THERAPY | Facility: HOME HEALTHCARE | Age: 46
End: 2021-07-19
Payer: COMMERCIAL

## 2021-07-19 DIAGNOSIS — S29.019A THORACIC MYOFASCIAL STRAIN, INITIAL ENCOUNTER: Primary | ICD-10-CM

## 2021-07-19 PROCEDURE — 97112 NEUROMUSCULAR REEDUCATION: CPT

## 2021-07-19 PROCEDURE — 97110 THERAPEUTIC EXERCISES: CPT

## 2021-07-19 NOTE — PROGRESS NOTES
Daily Note     Today's date: 2021  Patient name: Britta Mcrae  : 1975  MRN: [id number]  Referring provider: Robert Sherman DO  Dx: No diagnosis found  Start Time: 405          Subjective: I sit a lot at work but have made myself move more t/o the day  I also take a walk at lunch when I can  Objective: See treatment diary below    Assessment: Tolerated treatment well  Pt was able to complete TE with good octavio  She did report some mild B hip discomfort during [address]  Encourage consistency with HEP  Patient would benefit from continued PT    Plan: Continue per plan of care        Re-eval Date: 21    Date 21    Visit Count 1 2 3 4    FOTO Completed             Precautions: None       Manuals 21                                    Neuro Re-Ed         MTP/LTP  Green 1 x 20 ea  Green 1 x 20 ea Green 1 x 20 ea    Pk tband ER  Red 1 x 15  Red 1 x 15 Red  1 x 15    Palloff press         Wall angels with towel behind head                         Postural ed  HZ        Ther Ex   21    NuStep   L1  10 min  L 2  10' L 2  10'    Standing hip flex/abd/ext  1 x 15 ea Pk  1 x 15 ea Pk 1  x15 ea    Squats   1 x 15  1 x 15 1 x 15     Step-ups   C Fwd  1 x 15 Pk C Fwd   1 x 15 Pk C fwd   1  x15 Pk    PPT  5" x 10  5" x 10 5" x 10    PPT with marches        LTR  5" x 10  5" x 10 5" x 10    SKTC  5" x 5  5" x 5 5" x 5    TA        SLR  1 x 15 Pk  1 x 15 Pk 1 x 15 Pk    S/L SLR HEP       Clamshells  HEP       Heel walk outs         Quad hip extension        Doorway stretch  20" x 4  20" x 3 20" x 3    Self HS/Piriformis stretch  20" x 3 Pk HS 20" x 3 B HS  20" x 3 B HS    Small roll static stretch  Thoracic spine   Vert/horizontal   2 min ea dir   HEP                               Ther Activity                        Gait Training                        Modalities

## 2021-07-24 PROBLEM — Z12.4 SCREENING FOR CERVICAL CANCER: Status: ACTIVE | Noted: 2021-07-24

## 2021-07-24 PROBLEM — M72.2 PLANTAR FASCIITIS OF RIGHT FOOT: Status: ACTIVE | Noted: 2021-07-24

## 2021-07-24 NOTE — ASSESSMENT & PLAN NOTE
Patient is improving with physical therapy  I reviewed physical therapy notes  May continue Robaxin p r n

## 2021-07-24 NOTE — ASSESSMENT & PLAN NOTE
Exercises were given  I also recommended she purchase Tuli's heel cups from [employer]  I advised the patient that if she has no improvement or if she worsens, she may require referral to Podiatry for injections in her heel

## 2021-07-24 NOTE — ASSESSMENT & PLAN NOTE
Patient has hypertension  Blood pressure is well controlled  I recheck her blood pressure and found to be 130/82  The patient will continue Inderal LA 60 milligrams daily, which is also being prescribed for migraine headache prophylaxis  I encouraged the patient to follow a low-sodium diet and try to lose weight

## 2021-07-26 ENCOUNTER — OFFICE VISIT (OUTPATIENT)
Dept: PHYSICAL THERAPY | Facility: HOME HEALTHCARE | Age: 46
End: 2021-07-26
Payer: COMMERCIAL

## 2021-07-26 DIAGNOSIS — M54.16 LUMBAR RADICULOPATHY: ICD-10-CM

## 2021-07-26 DIAGNOSIS — S29.019A THORACIC MYOFASCIAL STRAIN, INITIAL ENCOUNTER: Primary | ICD-10-CM

## 2021-07-26 PROCEDURE — 97110 THERAPEUTIC EXERCISES: CPT

## 2021-07-26 PROCEDURE — 97112 NEUROMUSCULAR REEDUCATION: CPT

## 2021-07-26 NOTE — PROGRESS NOTES
Daily Note     Today's date: 2021  Patient name: Stef Lazaro  : 1975  MRN: [id number]  Referring provider: Eyad Salazar DO  Dx:   Encounter Diagnosis     ICD-10-CM    1  Thoracic myofascial strain, initial encounter  S29 019A    2  Lumbar radiculopathy  M54 16               Subjective: Pt reports she has no pain right now  Objective: See treatment diary below      Assessment: Tolerated treatment well  Progressed to foam with standing SLR three way and blue theraband with MTP/LTP today  Some verbal cues needed to perform exercises correctly  Pt with no c/o pain t/o session  Patient would benefit from continued PT      Plan: Continue per plan of care        Re-eval Date: 21    Date 21   Visit Count 1 2 3 4 5   FOTO Completed             Precautions: None       Manuals 21                                   Neuro Re-Ed         MTP/LTP  Green 1 x 20 ea  Green 1 x 20 ea Green 1 x 20 ea Blue 1 x 20 ea    Pk tband ER  Red 1 x 15  Red 1 x 15 Red  1 x 15 Red 1 x 15    Palloff press         Wall angels with towel behind head                         Postural ed  HZ        Ther Ex   21    NuStep   L1  10 min  L 2  10' L 2  10' L2  10 min    Standing hip flex/abd/ext  1 x 15 ea Pk  1 x 15 ea Pk 1  x15 ea 1 x 15 ea   foam   Squats   1 x 15  1 x 15 1 x 15  1 x 15    Step-ups   C Fwd  1 x 15 Pk C Fwd   1 x 15 Pk C fwd   1  x15 Pk C Fwd   1 x 15 Pk   PPT  5" x 10  5" x 10 5" x 10 5" x 10    PPT with marches        LTR  5" x 10  5" x 10 5" x 10 5" x 10    SKTC  5" x 5  5" x 5 5" x 5 5" x 5    TA        SLR  1 x 15 Pk  1 x 15 Pk 1 x 15 Pk 1 x 15 Pk   S/L SLR HEP       Clamshells  HEP       Heel walk outs         Quad hip extension        Doorway stretch  20" x 4  20" x 3 20" x 3 20" x 3    Self HS/Piriformis stretch  20" x 3 Pk HS 20" x 3 B HS  20" x 3 B HS 20" x 3 Pk HS   Small roll static stretch  Thoracic spine Vert/horizontal   2 min ea dir   HEP                               Ther Activity                        Gait Training                        Modalities

## 2021-08-02 DIAGNOSIS — K21.9 GASTROESOPHAGEAL REFLUX DISEASE: ICD-10-CM

## 2021-08-02 RX ORDER — OMEPRAZOLE 40 MG/1
40 CAPSULE, DELAYED RELEASE ORAL DAILY
Qty: 90 CAPSULE | Refills: 3 | Status: SHIPPED | OUTPATIENT
Start: 2021-08-02

## 2021-08-23 NOTE — PROGRESS NOTES
PT Discharge    Today's date: 2021  Patient name: Connie Parker  : 1975  MRN: [id number]  Referring provider: Alejandra Montes De Oca DO  Dx:   Encounter Diagnosis     ICD-10-CM    1  Thoracic myofascial strain, initial encounter  S29 019A    2  Lumbar radiculopathy  M54 16        Start Time: 1605  Stop Time: 1645  Total time in clinic (min): 40 minutes    Assessment  Assessment details: Pt Vee Jenkins is a 39 y o  y o  who presents to OPPT with s/s consistent with chronic lower and mid back pain  Pt presents with limited lumbar and thoracic mobility and core strength deficits, decreased R UE and B LE strength, impaired soft tissue mobility/limited flexibility, decreased postural awareness, and gait/balance dysfunctions  Pt with limitations with prolonged ambulation, difficulty with stair negotiation, disrupted sleep patterns, and difficulty with lifting/carrying  Pt has become more sedentary with limitations completing daily activities at home due to increased pain back with functional tasks  Pt would benefit from skilled therapy services to address outlined impairments, work towards goals, and restore pts PLOF  Thank you! UPDATE: Pt only attended 5 therapy sessions  She was placed on hold as she was going away on vacation and was to phone clinic upon return with need to return to therapy  Pt did not reach out to clinic and will be DC from OPPT at this time 2* to script expiration   Thank you for this referral    Impairments: abnormal gait, abnormal or restricted ROM, abnormal movement, activity intolerance, impaired balance, impaired physical strength, lacks appropriate home exercise program, pain with function, weight-bearing intolerance, poor posture  and poor body mechanics  Understanding of Dx/Px/POC: good   Prognosis: good    Goals  Goals NOT MET 2* pt did not complete full POC     STGs to be achieved in 4 weeks:  -Pt to demonstrate reduced subjective pain rating "at worst" by at least 2-3 points from Initial Eval to allow for reduced pain with ADLs and improved functional activity tolerance    -Pt to demonstrate ROM improved to minimal limitations t/o spine in order to maximize joint mobility and function and allow for progression of exercise program and achievement of goals    -Pt to demonstrate increased MMT of BLE by at least 1/2 grade in order to improve safety and stability with ADLs and functional mobility  LTGs to be achieved upon discharge:   -Pt will be I with HEP in order to continue to improve quality of life and independence and reduce risk for re-injury    -Pt to demonstrate return to activities of daily living without limiations or restrictions    -Pt will return to ambulation > 1 hour without symptoms to help facilitate return to community activities independently   -Pt to demonstrate ability to lift/carry > 10# without symptoms for return to household activities  -Pt to demonstrate return to work activities without limitations or restrictions    -Pt to demonstrate improved function as noted by achieving or exceeding predicted score on FOTO outcomes assessment tool  Plan  Plan details: DC from OPPT   Treatment plan discussed with: patient        Subjective Evaluation    History of Present Illness  Mechanism of injury: Pt reporting that she first began to have pain last fall and had imaging completed in 2020  She notes no specific injury or incident for onset of pain  She went to PCP who has on her mm relaxers which she takes as needed; medication does provide some relief  She recently saw PCP again who has now referred to OPPT for management of s/s  Return to MD again on 21     Quality of life: good    Pain  At best pain ratin  At worst pain rating: 10  Quality: dull ache and radiating  Relieving factors: rest and medications  Aggravating factors: lifting and sitting    Social Support  Steps to enter house: yes  Stairs in house: yes     Employment status: working  Hand dominance: right      Diagnostic Tests  X-ray: abnormal  Treatments  Previous treatment: chiropractic and medication  Current treatment: physical therapy  Patient Goals  Patient goals for therapy: increased strength, independence with ADLs/IADLs, increased motion, improved balance, decreased pain and decreased edema          Objective     Concurrent Complaints  Positive for disturbed sleep  Negative for dizziness, headaches, bladder dysfunction and bowel dysfunction    Postural Observations  Seated posture: fair  Standing posture: good        Neurological Testing     Sensation     Lumbar   Left   Intact: light touch    Right   Intact: light touch    Active Range of Motion   Cervical/Thoracic Spine       Thoracic    Left rotation:  Restriction level: moderate  Right rotation:  Restriction level: maximal    Lumbar   Flexion:  Restriction level: minimal  Extension:  Restriction level: minimal  Left lateral flexion:  Restriction level: minimal  Right lateral flexion:  Restriction level: minimal    Joint Play     Hypomobile: T3, T4, T5 and T6     Strength/Myotome Testing     Left Hip   Planes of Motion   Flexion: 4  Abduction: 4  Adduction: 4    Right Hip   Planes of Motion   Flexion: 3+  Abduction: 4-  Adduction: 4-    Left Knee   Flexion: 5  Extension: 5    Right Knee   Flexion: 4  Extension: 4    Tests     Lumbar     Left   Negative crossed SLR and passive SLR  Right   Positive passive SLR  Negative crossed SLR       General Comments:      Shoulder Comments   B UE ROM WFL     R UE MMT grossly 4/5  L UE MMT grossly 4+ to 5/5     Hip Comments   B LE ROM WFL   Neuro Exam:     Headaches   Patient reports headaches: No

## 2021-10-06 DIAGNOSIS — I10 HYPERTENSION, UNSPECIFIED TYPE: ICD-10-CM

## 2021-10-06 RX ORDER — PROPRANOLOL HCL 60 MG
60 CAPSULE, EXTENDED RELEASE 24HR ORAL DAILY
Qty: 90 CAPSULE | Refills: 1 | Status: SHIPPED | OUTPATIENT
Start: 2021-10-06 | End: 2022-03-28

## 2021-10-29 DIAGNOSIS — R60.9 EDEMA, UNSPECIFIED TYPE: ICD-10-CM

## 2021-10-29 RX ORDER — FUROSEMIDE 20 MG/1
20 TABLET ORAL AS NEEDED
Qty: 90 TABLET | Refills: 1 | Status: SHIPPED | OUTPATIENT
Start: 2021-10-29 | End: 2022-04-28

## 2021-11-01 ENCOUNTER — TELEPHONE (OUTPATIENT)
Dept: FAMILY MEDICINE CLINIC | Facility: CLINIC | Age: 46
End: 2021-11-01

## 2021-11-04 ENCOUNTER — OFFICE VISIT (OUTPATIENT)
Dept: FAMILY MEDICINE CLINIC | Facility: CLINIC | Age: 46
End: 2021-11-04
Payer: COMMERCIAL

## 2021-11-04 VITALS
DIASTOLIC BLOOD PRESSURE: 88 MMHG | HEART RATE: 73 BPM | OXYGEN SATURATION: 97 % | SYSTOLIC BLOOD PRESSURE: 126 MMHG | HEIGHT: 60 IN | BODY MASS INDEX: 38.85 KG/M2 | TEMPERATURE: 99.1 F | WEIGHT: 197.9 LBS

## 2021-11-04 DIAGNOSIS — R06.02 SHORTNESS OF BREATH: Primary | ICD-10-CM

## 2021-11-04 DIAGNOSIS — R13.10 DYSPHAGIA, UNSPECIFIED TYPE: ICD-10-CM

## 2021-11-04 DIAGNOSIS — M25.50 ARTHRALGIA, UNSPECIFIED JOINT: ICD-10-CM

## 2021-11-04 DIAGNOSIS — R10.11 RIGHT UPPER QUADRANT PAIN: ICD-10-CM

## 2021-11-04 PROCEDURE — 99214 OFFICE O/P EST MOD 30 MIN: CPT | Performed by: FAMILY MEDICINE

## 2021-11-04 PROCEDURE — 1036F TOBACCO NON-USER: CPT | Performed by: FAMILY MEDICINE

## 2021-11-05 ENCOUNTER — HOSPITAL ENCOUNTER (OUTPATIENT)
Dept: ULTRASOUND IMAGING | Facility: HOSPITAL | Age: 46
Discharge: HOME | End: 2021-11-05
Attending: FAMILY MEDICINE
Payer: COMMERCIAL

## 2021-11-05 DIAGNOSIS — R10.11 RIGHT UPPER QUADRANT PAIN: ICD-10-CM

## 2021-11-05 PROCEDURE — 76700 US EXAM ABDOM COMPLETE: CPT

## 2021-11-11 ENCOUNTER — RA CDI HCC (OUTPATIENT)
Dept: OTHER | Facility: HOSPITAL | Age: 46
End: 2021-11-11

## 2021-11-11 ENCOUNTER — APPOINTMENT (OUTPATIENT)
Dept: RADIOLOGY | Facility: CLINIC | Age: 46
End: 2021-11-11
Payer: COMMERCIAL

## 2021-11-11 DIAGNOSIS — R06.02 SHORTNESS OF BREATH: ICD-10-CM

## 2021-11-11 PROCEDURE — 71046 X-RAY EXAM CHEST 2 VIEWS: CPT

## 2021-11-18 ENCOUNTER — OFFICE VISIT (OUTPATIENT)
Dept: FAMILY MEDICINE CLINIC | Facility: CLINIC | Age: 46
End: 2021-11-18
Payer: COMMERCIAL

## 2021-11-18 VITALS
DIASTOLIC BLOOD PRESSURE: 88 MMHG | SYSTOLIC BLOOD PRESSURE: 120 MMHG | HEIGHT: 60 IN | HEART RATE: 70 BPM | OXYGEN SATURATION: 97 % | WEIGHT: 199.9 LBS | BODY MASS INDEX: 39.24 KG/M2 | TEMPERATURE: 99.1 F

## 2021-11-18 DIAGNOSIS — R73.9 HYPERGLYCEMIA: ICD-10-CM

## 2021-11-18 DIAGNOSIS — M25.50 ARTHRALGIA, UNSPECIFIED JOINT: Primary | ICD-10-CM

## 2021-11-18 DIAGNOSIS — K76.0 NONALCOHOLIC FATTY LIVER DISEASE: ICD-10-CM

## 2021-11-18 DIAGNOSIS — R06.02 SHORTNESS OF BREATH: ICD-10-CM

## 2021-11-18 DIAGNOSIS — Z23 ENCOUNTER FOR IMMUNIZATION: ICD-10-CM

## 2021-11-18 PROCEDURE — 99214 OFFICE O/P EST MOD 30 MIN: CPT | Performed by: FAMILY MEDICINE

## 2021-11-18 PROCEDURE — 3008F BODY MASS INDEX DOCD: CPT | Performed by: FAMILY MEDICINE

## 2021-11-18 PROCEDURE — 90682 RIV4 VACC RECOMBINANT DNA IM: CPT

## 2021-11-18 PROCEDURE — 90471 IMMUNIZATION ADMIN: CPT

## 2021-11-18 NOTE — ASSESSMENT & PLAN NOTE
Chest x-ray was negative  D-dimer was negative  BNP was negative  Echocardiogram is pending  I suspect that is going to be negative as well  Unclear find the patient's history, I learned that her shortness of breath occurs at night  This occurs after she develops bloating in her abdomen  Perhaps it is just gas pressing up on her diaphragm  Her pulse ox today is normal   Physical exam is unremarkable  Await results of echocardiogram   I expect that should be normal as well

## 2021-11-18 NOTE — ASSESSMENT & PLAN NOTE
Sed rate and C reactive protein her mildly elevated  Her Lyme antibody was negative  Unfortunately, lab did not do an MIRACLE and rheumatoid factor as ordered  I am going to reorder this    I again see no  Objective evidence of inflammation on exam

## 2021-11-18 NOTE — ASSESSMENT & PLAN NOTE
I explained ultrasound findings of Fatty liver disease  Patient was advised that a small percentage of patients with nonalcoholic fatty liver disease go on to develop cirrhosis of the liver   I advised following a low fat diet and weight loss

## 2021-11-18 NOTE — PATIENT INSTRUCTIONS
Non-Alcoholic Fatty Liver Disease   AMBULATORY CARE:   Non-alcoholic fatty liver disease (NAFLD)  is a buildup of fat in your liver from a condition other than alcohol abuse  NAFLD usually does not cause symptoms  You may have pain in the upper right side of your abdomen or feel tired  Any of the following can increase your risk for NAFLD:   · Health conditions such as obesity, high cholesterol, metabolic syndrome, type 2 diabetes, or hepatitis    · Certain medicines, such as chemotherapy, antibiotics, and heart medicines    · Exposure to chemicals that are toxic to the liver, such as pesticides, toluene, or vinyl chloride    · IV nutrition with total parenteral nutrition (TPN) for longer than 6 weeks    Call your local emergency number (911 in the [address] [facility]) or have someone call if:   · You have shortness of breath  · You have trouble thinking clearly or are confused  Seek care immediately if:   · You feel lightheaded or faint  · You have shaking, chills, and a fever  Call your doctor or liver specialist if:   · You have more pain or swelling in your abdomen  · You feel more tired than usual     · You bruise or bleed easily  · Your skin or the whites of your eyes look yellow  · You have questions or concerns about your condition or care  Treatment:  Medicine is usually not used to treat NAFLD  Medicine may be used to treat other health conditions and manage blood sugar or cholesterol levels  Your medicine may be changed if it is causing your NAFLD  Manage NAFLD:   · Maintain a healthy weight  Ask your healthcare provider what a healthy weight is for you  Ask him or her to help you create a weight loss plan if you are overweight  · Exercise as directed  Aerobic exercise 3 times a week for 20 to 45 minutes can help decrease fat buildup in your liver  Examples are cycling, brisk walking, or jogging  Ask your healthcare provider about the best exercise plan for you           · Eat a variety of healthy foods  Healthy foods include vegetables, fruit, whole-grain breads, low-fat dairy products, beans, lean meats, and fish  Foods low in simple carbohydrates, high-fructose corn syrup, and trans fat may help decrease fat buildup in your liver  · Do not drink alcohol  Alcohol may make NAFLD worse and harm your liver  Ask your healthcare provider for information if you need help to quit drinking  Follow up with your doctor as directed: You may need to return for more tests  You may also be referred to a specialist  Write down your questions so you remember to ask them during your visits  © Copyright [employer] 2021 Information is for End User's use only and may not be sold, redistributed or otherwise used for commercial purposes  All illustrations and images included in CareNotes® are the copyrighted property of [employer]  or Thad Fierro  The above information is an  only  It is not intended as medical advice for individual conditions or treatments  Talk to your doctor, nurse or pharmacist before following any medical regimen to see if it is safe and effective for you

## 2021-11-18 NOTE — PROGRESS NOTES
Assessment/Plan:    Nonalcoholic fatty liver disease  I explained ultrasound findings of Fatty liver disease  Patient was advised that a small percentage of patients with nonalcoholic fatty liver disease go on to develop cirrhosis of the liver  I advised following a low fat diet and weight loss    Hyperglycemia  Blood sugar was 160  There is a very strong family history of diabetes  I ordered a fasting blood sugar and hemoglobin A1c  I will make further recommendations when I get these results  Arthralgia    Sed rate and C reactive protein her mildly elevated  Her Lyme antibody was negative  Unfortunately, lab did not do an MIRACLE and rheumatoid factor as ordered  I am going to reorder this  I again see no  Objective evidence of inflammation on exam     Shortness of breath   Chest x-ray was negative  D-dimer was negative  BNP was negative  Echocardiogram is pending  I suspect that is going to be negative as well  Unclear find the patient's history, I learned that her shortness of breath occurs at night  This occurs after she develops bloating in her abdomen  Perhaps it is just gas pressing up on her diaphragm  Her pulse ox today is normal   Physical exam is unremarkable  Await results of echocardiogram   I expect that should be normal as well  Diagnoses and all orders for this visit:    Arthralgia, unspecified joint    Encounter for immunization  -     influenza vaccine, quadrivalent, recombinant, PF, 0 5 mL, for patients 18 yr+ (FLUBLOK)    Nonalcoholic fatty liver disease    Hyperglycemia    Shortness of breath          Subjective:      Patient ID: Santa James is a 55 y o  female  Patient is a 71-year-old white female who presents to the office today for her routine checkup and follow-up of her arthralgias  She continues to complain of arthralgias of her shoulders, hands, toes, hips, heels, as well as rib pain  She complains of being bloated and she complains of ankle edema    We reviewed her family history  Of note, her mother and father are both diabetic  The following portions of the patient's history were reviewed and updated as appropriate: allergies, current medications, past family history, past medical history, past social history, past surgical history and problem list     Review of Systems   Constitutional: Negative for activity change, appetite change and unexpected weight change  Respiratory: Positive for shortness of breath  Negative for cough and wheezing  Cardiovascular: Negative for chest pain, palpitations and leg swelling  Reports ankle edema   Gastrointestinal: Positive for abdominal distention  Negative for abdominal pain, blood in stool, constipation, diarrhea, nausea and vomiting  Musculoskeletal: Positive for arthralgias  Objective:      /88 (BP Location: Left arm, Patient Position: Sitting, Cuff Size: Adult)   Pulse 70   Temp 99 1 °F (37 3 °C) (Tympanic)   Ht 5' (1 524 m)   Wt 90 7 kg (199 lb 14 4 oz)   SpO2 97%   BMI 39 04 kg/m²          Physical Exam  Vitals reviewed  Constitutional:       Comments: Patient is a 80-year-old white female who appears her stated age  She is in no apparent distress   HENT:      Head: Normocephalic and atraumatic  Right Ear: Tympanic membrane, ear canal and external ear normal  There is no impacted cerumen  Left Ear: Tympanic membrane, ear canal and external ear normal  There is no impacted cerumen  Mouth/Throat:      Mouth: Mucous membranes are moist       Pharynx: Oropharynx is clear  No oropharyngeal exudate or posterior oropharyngeal erythema  Eyes:      General: No scleral icterus  Right eye: No discharge  Left eye: No discharge  Conjunctiva/sclera: Conjunctivae normal       Pupils: Pupils are equal, round, and reactive to light  Neck:      Comments: There was no JVD present  Cardiovascular:      Rate and Rhythm: Normal rate and regular rhythm        Heart sounds: Normal heart sounds  No murmur heard  No friction rub  No gallop  Pulmonary:      Effort: Pulmonary effort is normal  No respiratory distress  Breath sounds: Normal breath sounds  No stridor  No wheezing, rhonchi or rales  Abdominal:      General: Bowel sounds are normal  There is no distension  Palpations: Abdomen is soft  There is no mass  Tenderness: There is no abdominal tenderness  There is no guarding  Comments: There is no hepatosplenomegaly   Musculoskeletal:      Cervical back: Neck supple  Comments: There is no joint swelling, erythema, heat to the touch, or tenderness  Lymphadenopathy:      Cervical: No cervical adenopathy

## 2021-11-18 NOTE — ASSESSMENT & PLAN NOTE
Blood sugar was 160  There is a very strong family history of diabetes  I ordered a fasting blood sugar and hemoglobin A1c  I will make further recommendations when I get these results

## 2021-12-07 ENCOUNTER — TELEPHONE (OUTPATIENT)
Dept: FAMILY MEDICINE CLINIC | Facility: CLINIC | Age: 46
End: 2021-12-07

## 2021-12-10 DIAGNOSIS — R76.8 POSITIVE ANA (ANTINUCLEAR ANTIBODY): ICD-10-CM

## 2021-12-10 DIAGNOSIS — M25.50 ARTHRALGIA, UNSPECIFIED JOINT: Primary | ICD-10-CM

## 2022-01-06 ENCOUNTER — TELEPHONE (OUTPATIENT)
Dept: FAMILY MEDICINE CLINIC | Facility: CLINIC | Age: 47
End: 2022-01-06

## 2022-01-06 NOTE — TELEPHONE ENCOUNTER
Patient called asking for angie to return her call c/o the rheumatologist that she was told she would be sent to

## 2022-01-17 DIAGNOSIS — F32.0 MAJOR DEPRESSIVE DISORDER, SINGLE EPISODE, MILD (HCC): ICD-10-CM

## 2022-01-17 RX ORDER — BUPROPION HYDROCHLORIDE 150 MG/1
TABLET, EXTENDED RELEASE ORAL
Qty: 180 TABLET | Refills: 1 | Status: SHIPPED | OUTPATIENT
Start: 2022-01-17

## 2022-02-04 ENCOUNTER — TELEMEDICINE (OUTPATIENT)
Dept: FAMILY MEDICINE CLINIC | Facility: CLINIC | Age: 47
End: 2022-02-04
Payer: COMMERCIAL

## 2022-02-04 VITALS — TEMPERATURE: 97.7 F | BODY MASS INDEX: 34.38 KG/M2 | HEIGHT: 63 IN | WEIGHT: 194 LBS

## 2022-02-04 DIAGNOSIS — N30.00 ACUTE CYSTITIS WITHOUT HEMATURIA: Primary | ICD-10-CM

## 2022-02-04 PROCEDURE — 3008F BODY MASS INDEX DOCD: CPT | Performed by: FAMILY MEDICINE

## 2022-02-04 PROCEDURE — 99213 OFFICE O/P EST LOW 20 MIN: CPT | Performed by: FAMILY MEDICINE

## 2022-02-04 RX ORDER — NITROFURANTOIN 25; 75 MG/1; MG/1
100 CAPSULE ORAL 2 TIMES DAILY
Qty: 10 CAPSULE | Refills: 0 | Status: SHIPPED | OUTPATIENT
Start: 2022-02-04 | End: 2022-02-09

## 2022-02-04 NOTE — PROGRESS NOTES
Virtual Regular Visit    Verification of patient location:    Patient is located in the following state in which I hold an active license [place]      Assessment/Plan:    Problem List Items Addressed This Visit        Genitourinary    Acute cystitis without hematuria - Primary     I reviewed the patient's allergy history with her  She has no known drug allergies  I reviewed her medications  Patient's symptoms are compatible with urinary tract infection  I am going to start her on Macrobid 100 mg b i d  With food  She has been advised to drink plenty of fluids  Call if she develops any fever or no improvement or worsens  I expect she should be feeling 100% better within 3 days  Relevant Medications    nitrofurantoin (MACROBID) 100 mg capsule               Reason for visit is No chief complaint on file  Encounter provider Alea Lazcano DO    Provider located at [address] [place] [id number] [phone]      Recent Visits  No visits were found meeting these conditions  Showing recent visits within past 7 days and meeting all other requirements  Today's Visits  Date Type Provider Dept   02/04/22 Telemedicine Alea Lazcano DO  [facility]   Showing today's visits and meeting all other requirements  Future Appointments  No visits were found meeting these conditions  Showing future appointments within next 150 days and meeting all other requirements       The patient was identified by name and date of birth  Cecilio Carty was informed that this is a telemedicine visit and that the visit is being conducted through [facility] and patient was informed that this is a secure, HIPAA-compliant platform  She agrees to proceed     My office door was closed  No one else was in the room  She acknowledged consent and understanding of privacy and security of the video platform   The patient has agreed to participate and understands they can discontinue the visit at any time  Patient is aware this is a billable service  Subjective  Santa James is a 55 y o  female who believes she has a UTI   Difficulty Urinating   This is a new problem  The current episode started yesterday  The problem occurs every urination  The problem has been unchanged  The quality of the pain is described as aching  The pain is at a severity of 4/10  There has been no fever  She is sexually active  There is no history of pyelonephritis  Associated symptoms include frequency and urgency  Pertinent negatives include no chills, discharge, flank pain, hematuria, hesitancy, nausea, possible pregnancy, sweats or vomiting  Past Medical History:   Diagnosis Date    GERD (gastroesophageal reflux disease)     Hypertension        Past Surgical History:   Procedure Laterality Date    ABLATION SOFT TISSUE      OTHER SURGICAL HISTORY      uterine ablation    TUBAL LIGATION         Current Outpatient Medications   Medication Sig Dispense Refill    buPROPion (WELLBUTRIN SR) 150 mg 12 hr tablet TAKE 1 TABLET BY MOUTH TWICE A  tablet 1    furosemide (LASIX) 20 mg tablet Take 1 tablet (20 mg total) by mouth as needed (edema) 90 tablet 1    methocarbamol (ROBAXIN) 750 mg tablet Take 1 tablet (750 mg total) by mouth 3 (three) times a day 90 tablet 0    nitrofurantoin (MACROBID) 100 mg capsule Take 1 capsule (100 mg total) by mouth 2 (two) times a day for 5 days 10 capsule 0    omeprazole (PriLOSEC) 40 MG capsule Take 1 capsule (40 mg total) by mouth daily 90 capsule 3    propranolol (INDERAL LA) 60 mg 24 hr capsule Take 1 capsule (60 mg total) by mouth daily 90 capsule 1     No current facility-administered medications for this visit  No Known Allergies    Review of Systems   Constitutional: Negative for appetite change and chills  Gastrointestinal: Negative for abdominal pain, nausea and vomiting     Genitourinary: Positive for dysuria, frequency and urgency  Negative for flank pain, hematuria and hesitancy  Video Exam    Vitals:    02/04/22 1007   Temp: 97 7 °F (36 5 °C)   Weight: 88 kg (194 lb)   Height: 5' 3" (1 6 m)       Physical Exam  Vitals reviewed  Constitutional:       Comments: This is a 51-year-old white female who appears her stated age  She was nonseptic in appearance  She was smiling  HENT:      Head: Normocephalic and atraumatic  Right Ear: External ear normal       Left Ear: External ear normal       Mouth/Throat:      Pharynx: Oropharynx is clear  No oropharyngeal exudate or posterior oropharyngeal erythema  Eyes:      General: Scleral icterus present  Right eye: No discharge  Left eye: No discharge  Conjunctiva/sclera: Conjunctivae normal    Pulmonary:      Comments: Patient did not cough during her video visit  She was not tachypneic  She did not appear to be in any respiratory distress  Lymphadenopathy:      Cervical: No cervical adenopathy  I spent 12 minutes directly with the patient during this visit    VIRTUAL VISIT DISCLAIMER      Deep Alvarado verbally agrees to participate in [employer]  Pt is aware that [employer] could be limited without vital signs or the ability to perform a full hands-on physical exam  Mallika Jenkins understands she or the provider may request at any time to terminate the video visit and request the patient to seek care or treatment in person

## 2022-02-04 NOTE — ASSESSMENT & PLAN NOTE
I reviewed the patient's allergy history with her  She has no known drug allergies  I reviewed her medications  Patient's symptoms are compatible with urinary tract infection  I am going to start her on Macrobid 100 mg b i d  With food  She has been advised to drink plenty of fluids  Call if she develops any fever or no improvement or worsens  I expect she should be feeling 100% better within 3 days

## 2022-03-02 PROBLEM — M47.816 LUMBAR SPONDYLOSIS: Status: ACTIVE | Noted: 2022-03-02

## 2022-03-02 PROBLEM — K75.81 NASH (NONALCOHOLIC STEATOHEPATITIS): Status: ACTIVE | Noted: 2022-03-02

## 2022-03-02 PROBLEM — M35.9 UNDIFFERENTIATED CONNECTIVE TISSUE DISEASE (HCC): Status: ACTIVE | Noted: 2022-03-02

## 2022-03-02 PROBLEM — M06.09 SERONEGATIVE ARTHROPATHY OF MULTIPLE SITES (HCC): Status: ACTIVE | Noted: 2022-03-02

## 2022-03-02 PROBLEM — M15.0 PRIMARY GENERALIZED (OSTEO)ARTHRITIS: Status: ACTIVE | Noted: 2022-03-02

## 2022-03-02 PROBLEM — K58.0 IRRITABLE BOWEL SYNDROME WITH DIARRHEA: Status: ACTIVE | Noted: 2022-03-02

## 2022-03-02 PROBLEM — G47.00 INSOMNIA: Status: ACTIVE | Noted: 2022-03-02

## 2022-03-02 PROBLEM — R53.82 CHRONIC FATIGUE: Status: ACTIVE | Noted: 2021-01-28

## 2022-03-26 DIAGNOSIS — I10 HYPERTENSION, UNSPECIFIED TYPE: ICD-10-CM

## 2022-03-28 RX ORDER — PROPRANOLOL HCL 60 MG
CAPSULE, EXTENDED RELEASE 24HR ORAL
Qty: 90 CAPSULE | Refills: 1 | Status: SHIPPED | OUTPATIENT
Start: 2022-03-28

## 2022-04-12 ENCOUNTER — HOSPITAL ENCOUNTER (OUTPATIENT)
Dept: RADIOLOGY | Facility: HOSPITAL | Age: 47
Discharge: HOME | End: 2022-04-12
Payer: COMMERCIAL

## 2022-04-12 DIAGNOSIS — M06.09 SERONEGATIVE ARTHROPATHY OF MULTIPLE SITES (HCC): ICD-10-CM

## 2022-04-12 PROCEDURE — 76882 US LMTD JT/FCL EVL NVASC XTR: CPT

## 2022-04-12 PROCEDURE — 73630 X-RAY EXAM OF FOOT: CPT

## 2022-04-12 PROCEDURE — 73130 X-RAY EXAM OF HAND: CPT

## 2022-04-28 DIAGNOSIS — R60.9 EDEMA, UNSPECIFIED TYPE: ICD-10-CM

## 2022-04-28 RX ORDER — FUROSEMIDE 20 MG/1
20 TABLET ORAL AS NEEDED
Qty: 90 TABLET | Refills: 1 | Status: SHIPPED | OUTPATIENT
Start: 2022-04-28

## 2022-05-07 PROBLEM — R82.81 PYURIA: Status: ACTIVE | Noted: 2022-05-07

## 2022-05-07 PROBLEM — M79.7 FIBROMYALGIA: Status: ACTIVE | Noted: 2022-05-07

## 2022-05-18 ENCOUNTER — OFFICE VISIT (OUTPATIENT)
Dept: FAMILY MEDICINE CLINIC | Facility: CLINIC | Age: 47
End: 2022-05-18
Payer: COMMERCIAL

## 2022-05-18 VITALS
DIASTOLIC BLOOD PRESSURE: 86 MMHG | WEIGHT: 202.9 LBS | BODY MASS INDEX: 35.95 KG/M2 | TEMPERATURE: 99.2 F | SYSTOLIC BLOOD PRESSURE: 122 MMHG | HEART RATE: 74 BPM | OXYGEN SATURATION: 98 % | HEIGHT: 63 IN

## 2022-05-18 DIAGNOSIS — Z12.31 ENCOUNTER FOR SCREENING MAMMOGRAM FOR BREAST CANCER: ICD-10-CM

## 2022-05-18 DIAGNOSIS — F33.9 DEPRESSION, RECURRENT (HCC): ICD-10-CM

## 2022-05-18 DIAGNOSIS — G43.009 MIGRAINE WITHOUT AURA AND WITHOUT STATUS MIGRAINOSUS, NOT INTRACTABLE: ICD-10-CM

## 2022-05-18 DIAGNOSIS — K21.9 GASTROESOPHAGEAL REFLUX DISEASE WITHOUT ESOPHAGITIS: ICD-10-CM

## 2022-05-18 DIAGNOSIS — L30.1 POMPHOLYX: ICD-10-CM

## 2022-05-18 DIAGNOSIS — F32.0 MAJOR DEPRESSIVE DISORDER, SINGLE EPISODE, MILD (HCC): ICD-10-CM

## 2022-05-18 DIAGNOSIS — I10 ESSENTIAL HYPERTENSION: ICD-10-CM

## 2022-05-18 DIAGNOSIS — Z11.59 NEED FOR HEPATITIS C SCREENING TEST: Primary | ICD-10-CM

## 2022-05-18 PROCEDURE — 1036F TOBACCO NON-USER: CPT | Performed by: FAMILY MEDICINE

## 2022-05-18 PROCEDURE — 99214 OFFICE O/P EST MOD 30 MIN: CPT | Performed by: FAMILY MEDICINE

## 2022-05-18 PROCEDURE — 3725F SCREEN DEPRESSION PERFORMED: CPT | Performed by: FAMILY MEDICINE

## 2022-05-18 PROCEDURE — 3008F BODY MASS INDEX DOCD: CPT | Performed by: FAMILY MEDICINE

## 2022-05-18 RX ORDER — TRIAMCINOLONE ACETONIDE 1 MG/G
CREAM TOPICAL 2 TIMES DAILY
Qty: 45 G | Refills: 1 | Status: SHIPPED | OUTPATIENT
Start: 2022-05-18

## 2022-05-18 NOTE — PROGRESS NOTES
Assessment/Plan:    Essential hypertension  Patient has hypertension  I check her blood pressure myself and found it to be 130/86  The patient will continue propranolol, which is also being prescribed for migraine prophylaxis  The propranolol has worked well in controlling the patient's blood pressures  Migraine without aura and without status migrainosus, not intractable  Migraines are currently controlled  Patient will continue her propranolol LA 60 mg daily    Gastroesophageal reflux disease  Patient has gastroesophageal reflux disease  Her symptoms are controlled on omeprazole 40 mg daily  I note that the patient is now taking Mobic 15 mg daily, prescribed by her rheumatologist   I told the patient she must tried not to miss any doses of her omeprazole as Mobic can cause ulcers  I think the omeprazole should have a gastro protective affect for her  Major depressive disorder, single episode, mild (HCC)  Depression is controlled on bupropion 150 mg b i d   Patient is experiencing sleep disturbance  She is trying over-the-counter supplements to try to help with her sleep  She wants to try to avoid any prescription medications for sleep  Diagnoses and all orders for this visit:    Need for hepatitis C screening test    Encounter for screening mammogram for breast cancer    Pompholyx  -     triamcinolone (KENALOG) 0 1 % cream; Apply topically 2 (two) times a day    Depression, recurrent (HCC)    Essential hypertension    Migraine without aura and without status migrainosus, not intractable    Gastroesophageal reflux disease without esophagitis    Major depressive disorder, single episode, mild (HCC)          Subjective:      Patient ID: Albert Poe is a 55 y o  female  This is a 41-year-old white female who presents to the office today for her routine checkup  The patient is now seeing rheumatology  She still does not have a firm diagnosis is established for her arthralgias    She tells me that rheumatology does feel that she has fibromyalgia  Patient is trying to watch her diet and exercising  The following portions of the patient's history were reviewed and updated as appropriate: allergies, current medications, past family history, past medical history, past social history, past surgical history and problem list     Review of Systems   Cardiovascular: Negative for chest pain, palpitations and leg swelling  Gastrointestinal: Negative for abdominal distention, abdominal pain, blood in stool, constipation, diarrhea and nausea  Musculoskeletal: Positive for arthralgias, back pain and myalgias  Negative for gait problem  Psychiatric/Behavioral: Positive for sleep disturbance  Negative for dysphoric mood  The patient is not nervous/anxious  Objective:      /86 (BP Location: Left arm, Patient Position: Sitting, Cuff Size: Large)   Pulse 74   Temp 99 2 °F (37 3 °C) (Tympanic)   Ht 5' 3" (1 6 m)   Wt 92 kg (202 lb 14 4 oz)   SpO2 98%   BMI 35 94 kg/m²          Physical Exam  Vitals reviewed  Constitutional:       Comments: This is a pleasant 59-year-old white female who appears her stated age  She is cooperative and in no apparent distress   HENT:      Head: Normocephalic and atraumatic  Right Ear: Tympanic membrane, ear canal and external ear normal  There is no impacted cerumen  Left Ear: Tympanic membrane, ear canal and external ear normal  There is no impacted cerumen  Mouth/Throat:      Mouth: Mucous membranes are moist       Pharynx: Oropharynx is clear  No oropharyngeal exudate or posterior oropharyngeal erythema  Eyes:      General: No scleral icterus  Right eye: No discharge  Left eye: No discharge  Conjunctiva/sclera: Conjunctivae normal       Pupils: Pupils are equal, round, and reactive to light  Neck:      Comments: No thyromegaly noted  Cardiovascular:      Rate and Rhythm: Normal rate and regular rhythm        Heart sounds: Normal heart sounds  No murmur heard  No friction rub  No gallop  Pulmonary:      Effort: Pulmonary effort is normal  No respiratory distress  Breath sounds: Normal breath sounds  No stridor  No wheezing, rhonchi or rales  Abdominal:      General: Bowel sounds are normal  There is no distension  Palpations: Abdomen is soft  There is no mass  Tenderness: There is no abdominal tenderness  There is no guarding  Comments: No organomegaly noted   Musculoskeletal:      Cervical back: Neck supple  Lymphadenopathy:      Cervical: No cervical adenopathy  Psychiatric:         Mood and Affect: Mood normal          Behavior: Behavior normal          Thought Content:  Thought content normal          Judgment: Judgment normal        extremities:  Without cyanosis, clubbing, or edema

## 2022-06-06 ENCOUNTER — TELEPHONE (OUTPATIENT)
Dept: ADMINISTRATIVE | Facility: OTHER | Age: 47
End: 2022-06-06

## 2022-06-06 NOTE — TELEPHONE ENCOUNTER
----- Message from Parth Coffman sent at 6/6/2022  8:39 AM EDT -----  Regarding: CARE GAP REQUEST  06/06/22 8:39 AM    Hello, our patient No patient name on file  has had Mammogram completed/performed  Please assist in updating the patient chart by pulling the Care Everywhere (CE) document  The date of service is 2/16/2022       Thank you,  Kassandra Jones MA   Sharri

## 2022-06-06 NOTE — TELEPHONE ENCOUNTER
Upon review of the In Basket request we were able to locate, review, and update the patient chart as requested for Mammogram     Any additional questions or concerns should be emailed to the Practice Liaisons via [email or web address] email, please do not reply via In Basket      Thank you  Juan A Navarro MA

## 2022-07-16 LAB
BIOMARKER COMMENT: NORMAL
CRP RESULT: 7.7 MG/L
EGF RESULT: 67 PG/ML
FOOTNOTE/HISTORY: NORMAL
IL-6 RESULT: 10 PG/ML
LEPTIN RESULT: 26 NG/ML
Lab: NORMAL
Lab: NORMAL
MMP-1 RESULT: 7.5 NG/ML
MMP-3 RESULT: 5.3 NG/ML
RESISTIN RESULT: 4.7 NG/ML
RISK OF RADIOGRAPHIC PROGRESS.: 6 %
SAA RESULT: 2.4 UG/ML
TNF-RI RESULT: 0.69 NG/ML
VCAM-1 RESULT: 0.46 UG/ML
VECTRA(R) LEVEL: NORMAL
VECTRA(R) SCORE: 43
VEGF-A RESULT: 80 PG/ML
YKL-40 RESULT: 23 NG/ML

## 2022-08-12 DIAGNOSIS — K21.9 GASTROESOPHAGEAL REFLUX DISEASE: ICD-10-CM

## 2022-08-12 DIAGNOSIS — I10 HYPERTENSION, UNSPECIFIED TYPE: ICD-10-CM

## 2022-08-12 RX ORDER — PROPRANOLOL HCL 60 MG
CAPSULE, EXTENDED RELEASE 24HR ORAL
Qty: 90 CAPSULE | Refills: 1 | Status: SHIPPED | OUTPATIENT
Start: 2022-08-12 | End: 2022-10-06 | Stop reason: SDUPTHER

## 2022-08-12 RX ORDER — OMEPRAZOLE 40 MG/1
CAPSULE, DELAYED RELEASE ORAL
Qty: 90 CAPSULE | Refills: 3 | Status: SHIPPED | OUTPATIENT
Start: 2022-08-12

## 2022-09-15 PROBLEM — M06.09 SERONEGATIVE ARTHROPATHY OF MULTIPLE SITES (HCC): Status: RESOLVED | Noted: 2022-03-02 | Resolved: 2022-09-15

## 2022-09-15 PROBLEM — R76.8 POSITIVE ANA (ANTINUCLEAR ANTIBODY): Status: ACTIVE | Noted: 2022-09-15

## 2022-10-12 PROBLEM — N30.00 ACUTE CYSTITIS WITHOUT HEMATURIA: Status: RESOLVED | Noted: 2022-02-04 | Resolved: 2022-10-12

## 2022-10-12 PROBLEM — Z12.4 SCREENING FOR CERVICAL CANCER: Status: RESOLVED | Noted: 2021-07-24 | Resolved: 2022-10-12

## 2022-10-27 DIAGNOSIS — R60.9 EDEMA, UNSPECIFIED TYPE: ICD-10-CM

## 2022-10-27 RX ORDER — FUROSEMIDE 20 MG/1
20 TABLET ORAL AS NEEDED
Qty: 90 TABLET | Refills: 1 | Status: SHIPPED | OUTPATIENT
Start: 2022-10-27

## 2022-11-02 ENCOUNTER — RA CDI HCC (OUTPATIENT)
Dept: OTHER | Facility: HOSPITAL | Age: 47
End: 2022-11-02

## 2022-11-02 NOTE — PROGRESS NOTES
[facility] 75  coding opportunities          Chart Reviewed number of suggestions sent to Provider: 1    E66 01, Z68 35- Severe obesity with BMI over 35 and comorbid condition(HCC)  *BMI of 35 50 with hypertension    If this is correct, please document and assess at your next visit   11/9/22     Patients Insurance       Commercial Insurance: [employer]

## 2022-11-09 ENCOUNTER — OFFICE VISIT (OUTPATIENT)
Dept: FAMILY MEDICINE CLINIC | Facility: CLINIC | Age: 47
End: 2022-11-09

## 2022-11-09 VITALS
HEART RATE: 79 BPM | OXYGEN SATURATION: 97 % | WEIGHT: 201.5 LBS | HEIGHT: 63 IN | BODY MASS INDEX: 35.7 KG/M2 | DIASTOLIC BLOOD PRESSURE: 74 MMHG | TEMPERATURE: 99 F | SYSTOLIC BLOOD PRESSURE: 110 MMHG

## 2022-11-09 DIAGNOSIS — G43.009 MIGRAINE WITHOUT AURA AND WITHOUT STATUS MIGRAINOSUS, NOT INTRACTABLE: ICD-10-CM

## 2022-11-09 DIAGNOSIS — F32.0 MAJOR DEPRESSIVE DISORDER, SINGLE EPISODE, MILD (HCC): ICD-10-CM

## 2022-11-09 DIAGNOSIS — I10 ESSENTIAL HYPERTENSION: Primary | ICD-10-CM

## 2022-11-09 DIAGNOSIS — G47.00 INSOMNIA, UNSPECIFIED TYPE: ICD-10-CM

## 2022-11-09 DIAGNOSIS — Z23 ENCOUNTER FOR IMMUNIZATION: ICD-10-CM

## 2022-11-09 RX ORDER — BUPROPION HYDROCHLORIDE 150 MG/1
150 TABLET, EXTENDED RELEASE ORAL 2 TIMES DAILY
Qty: 180 TABLET | Refills: 2 | Status: SHIPPED | OUTPATIENT
Start: 2022-11-09

## 2022-11-09 RX ORDER — ZOLPIDEM TARTRATE 5 MG/1
5 TABLET ORAL
Qty: 30 TABLET | Refills: 2 | Status: SHIPPED | OUTPATIENT
Start: 2022-11-09

## 2022-11-09 NOTE — PROGRESS NOTES
Name: Min Bahena      : 1975      MRN: [id number]  Encounter Provider: Gerda Whiting DO  Encounter Date: 2022   Encounter department: [facility]    Assessment & Plan     1  Essential hypertension  Assessment & Plan:  Patient has hypertension which is well controlled  Blood pressure today was 122/80  The patient will continue propranolol 60 mg daily  This medicine is also being utilized for migraine prophylaxis  2  Major depressive disorder, single episode, mild (HCC)  Assessment & Plan:  I renewed the patient's prescription for bupropion 150 mg  She will continue 1 tablet twice a day  Depression is now controlled  Orders:  -     buPROPion (WELLBUTRIN SR) 150 mg 12 hr tablet; Take 1 tablet (150 mg total) by mouth 2 (two) times a day    3  Insomnia, unspecified type  Assessment & Plan:  Patient has insomnia  Her insomnia began well before she lost her job  I am going to start the patient on Ambien 5 mg  I queried the [place] prescription drug monitoring program   There were no red flags and it was safe to proceed  I advised the patient that she needs to take the medication when she gets into bed  She should only take the Ambien when she has 7 or 8 hours to dedicate to sleep  Otherwise, she should skip the medication  She should never combine this medication with alcohol  Orders:  -     zolpidem (AMBIEN) 5 mg tablet; Take 1 tablet (5 mg total) by mouth daily at bedtime as needed for sleep    4  Encounter for immunization  -     influenza vaccine, quadrivalent, 0 5 mL, preservative-free, for adult and pediatric patients 6 mos+ (AFLURIA, FLUARIX, FLULAVAL, FLUZONE)    5  Migraine without aura and without status migrainosus, not intractable  Assessment & Plan:  As noted, migraines have been stable  Patient reports they have been very well controlled  I plan to continue propranolol LA 60 mg daily      BMI Counseling: Body mass index is 35 69 kg/m²   The BMI is above normal  Nutrition recommendations include decreasing portion sizes and moderation in carbohydrate intake  Exercise recommendations include exercising 3-5 times per week  Rationale for BMI follow-up plan is due to patient being overweight or obese  Subjective      This is a 80-year-old white female who presents to the office today for her routine checkup  The patient is currently seeing Rheumatology  She tells me she still being worked up for her elevated MIRACLE  She tells me she lost her job 2 months ago  She finds that the bupropion is working to treat her depression  However, she still having problems sleeping  She tells me her  gets up at 3:15 a m  Every morning  She is up until he leaves for work  She tells me she will often fall sleep for 2 hours or so in the early morning hours  She is then up for the rest of the day  She admits to feeling tired  She does not take naps  Review of Systems   Constitutional: Positive for fatigue  Respiratory: Negative for cough and shortness of breath  Cardiovascular: Negative for chest pain, palpitations and leg swelling  Gastrointestinal: Negative for abdominal distention, abdominal pain, blood in stool, constipation, diarrhea and nausea  Endocrine:        Positive for hot flashes   Musculoskeletal: Positive for arthralgias and myalgias  Psychiatric/Behavioral: Positive for sleep disturbance  Negative for dysphoric mood         Current Outpatient Medications on File Prior to Visit   Medication Sig   • furosemide (LASIX) 20 mg tablet TAKE 1 TABLET (20 MG TOTAL) BY MOUTH AS NEEDED (EDEMA)   • meloxicam (MOBIC) 15 mg tablet Take 1 tablet (15 mg total) by mouth daily   • omeprazole (PriLOSEC) 40 MG capsule TAKE 1 CAPSULE BY MOUTH EVERY DAY   • propranolol (INDERAL LA) 60 mg 24 hr capsule Take 1 capsule (60 mg total) by mouth daily   • triamcinolone (KENALOG) 0 1 % cream Apply topically 2 (two) times a day   • [DISCONTINUED] buPROPion [facility] SR) 150 mg 12 hr tablet TAKE 1 TABLET BY MOUTH TWICE A DAY   • [DISCONTINUED] methocarbamol (ROBAXIN) 750 mg tablet Take 1 tablet (750 mg total) by mouth 3 (three) times a day (Patient not taking: Reported on 11/9/2022)       Objective     /74   Pulse 79   Temp 99 °F (37 2 °C) (Tympanic)   Ht 5' 3" (1 6 m)   Wt 91 4 kg (201 lb 8 oz)   SpO2 97%   BMI 35 69 kg/m²     Physical Exam  Vitals reviewed  Constitutional:       Comments: This is a 70-year-old white female who appears her stated age  She is pleasant, cooperative, and in no distress   HENT:      Head: Normocephalic and atraumatic  Right Ear: Tympanic membrane, ear canal and external ear normal  There is no impacted cerumen  Left Ear: Tympanic membrane, ear canal and external ear normal  There is no impacted cerumen  Mouth/Throat:      Mouth: Mucous membranes are moist       Pharynx: Oropharynx is clear  No oropharyngeal exudate or posterior oropharyngeal erythema  Eyes:      General: No scleral icterus  Right eye: No discharge  Left eye: No discharge  Conjunctiva/sclera: Conjunctivae normal       Pupils: Pupils are equal, round, and reactive to light  Neck:      Vascular: No carotid bruit  Comments: No thyromegaly is noted  Cardiovascular:      Rate and Rhythm: Normal rate and regular rhythm  Heart sounds: Normal heart sounds  No murmur heard  No friction rub  No gallop  Pulmonary:      Effort: Pulmonary effort is normal  No respiratory distress  Breath sounds: Normal breath sounds  No stridor  No wheezing, rhonchi or rales  Abdominal:      General: Bowel sounds are normal  There is no distension  Palpations: Abdomen is soft  There is no mass  Tenderness: There is no abdominal tenderness  There is no guarding  Musculoskeletal:      Cervical back: Neck supple  Lymphadenopathy:      Cervical: No cervical adenopathy     Psychiatric:         Mood and Affect: Mood normal          Behavior: Behavior normal          Thought Content:  Thought content normal          Judgment: Judgment normal      Extremities:  Without cyanosis, clubbing, or edema    Leo Boyce DO

## 2022-11-10 NOTE — ASSESSMENT & PLAN NOTE
Patient has insomnia  Her insomnia began well before she lost her job  I am going to start the patient on Ambien 5 mg  I queried the [place] prescription drug monitoring program   There were no red flags and it was safe to proceed  I advised the patient that she needs to take the medication when she gets into bed  She should only take the Ambien when she has 7 or 8 hours to dedicate to sleep  Otherwise, she should skip the medication  She should never combine this medication with alcohol

## 2022-11-10 NOTE — ASSESSMENT & PLAN NOTE
As noted, migraines have been stable  Patient reports they have been very well controlled    I plan to continue propranolol LA 60 mg daily

## 2022-11-10 NOTE — ASSESSMENT & PLAN NOTE
Patient has hypertension which is well controlled  Blood pressure today was 122/80  The patient will continue propranolol 60 mg daily  This medicine is also being utilized for migraine prophylaxis  Statement Selected

## 2022-11-10 NOTE — ASSESSMENT & PLAN NOTE
I renewed the patient's prescription for bupropion 150 mg  She will continue 1 tablet twice a day  Depression is now controlled

## 2023-04-03 DIAGNOSIS — I10 HYPERTENSION, UNSPECIFIED TYPE: ICD-10-CM

## 2023-04-03 RX ORDER — PROPRANOLOL HCL 60 MG
CAPSULE, EXTENDED RELEASE 24HR ORAL
Qty: 90 CAPSULE | Refills: 1 | Status: SHIPPED | OUTPATIENT
Start: 2023-04-03

## 2023-05-02 DIAGNOSIS — R60.9 EDEMA, UNSPECIFIED TYPE: ICD-10-CM

## 2023-05-02 RX ORDER — FUROSEMIDE 20 MG/1
20 TABLET ORAL AS NEEDED
Qty: 90 TABLET | Refills: 1 | Status: SHIPPED | OUTPATIENT
Start: 2023-05-02

## 2023-05-17 ENCOUNTER — OFFICE VISIT (OUTPATIENT)
Dept: FAMILY MEDICINE CLINIC | Facility: CLINIC | Age: 48
End: 2023-05-17

## 2023-05-17 VITALS
HEART RATE: 70 BPM | BODY MASS INDEX: 35.4 KG/M2 | OXYGEN SATURATION: 97 % | WEIGHT: 199.8 LBS | SYSTOLIC BLOOD PRESSURE: 134 MMHG | DIASTOLIC BLOOD PRESSURE: 68 MMHG | HEIGHT: 63 IN | TEMPERATURE: 98.6 F

## 2023-05-17 DIAGNOSIS — I10 ESSENTIAL HYPERTENSION: Primary | ICD-10-CM

## 2023-05-17 DIAGNOSIS — M35.9 UNDIFFERENTIATED CONNECTIVE TISSUE DISEASE (HCC): ICD-10-CM

## 2023-05-17 DIAGNOSIS — E78.5 DYSLIPIDEMIA: ICD-10-CM

## 2023-05-17 DIAGNOSIS — F32.0 MAJOR DEPRESSIVE DISORDER, SINGLE EPISODE, MILD (HCC): ICD-10-CM

## 2023-05-17 DIAGNOSIS — M94.0 COSTOCHONDRITIS: ICD-10-CM

## 2023-05-17 DIAGNOSIS — Z12.11 COLON CANCER SCREENING: ICD-10-CM

## 2023-05-17 NOTE — PROGRESS NOTES
Name: Casey Roberts      : 1975      MRN: [id number]  Encounter Provider: Tonia Menendez DO  Encounter Date: 2023   Encounter department: [place] [address] PRIMARY CARE    Assessment & Plan     1  Essential hypertension  Assessment & Plan:  Patient has hypertension  I checked her blood pressure myself and found her blood pressure to be 134/68  Patient will continue propranolol, which is also being used for migraine prophylaxis  2  Dyslipidemia  -     Lipid panel; Future    3  Undifferentiated connective tissue disease ([facility] )    4  Colon cancer screening  -     Ambulatory Referral to Gastroenterology; Future    5  Major depressive disorder, single episode, mild ([facility] )  Assessment & Plan:  Patient has depression which is currently stable  She will continue bupropion 150 mg twice a day  Continue Ambien 5 mg at bedtime as needed for insomnia  I did discuss with the patient and her issues  I reminded her that she should only take Ambien if she has 7 or 8 hours to dedicate to sleep  Otherwise, she may wake up in the morning groggy or with a hangover  She should also never combine Ambien with alcohol  Did tell the patient that she can take melatonin on the evenings where she is unable to take the Ambien  6  Costochondritis  Assessment & Plan: This patient has reproducibility of her chest pain over her costochondral joints  She likely has costochondritis  Fibromyalgia may be contributing to her symptoms as well  Really no reason she should have tenderness to palpation over the clavicles  She admits that she only has pain if she presses on the area  Therefore, no treatment is required  Subjective      Is a 17-year-old white female who presents to the office today for her routine checkup  The patient complains of pain in her collarbones and just under her collarbones  She tells me the only time she feels this pain is if she pushes on the area    Patient is being followed by "rheumatology for fibromyalgia  Patient reports compliance with her medication  She finds that the bupropion has been effective in treating her anxiety and depression  She also notes that the Ambien works for her insomnia  However, she often does not take it when she needs to because she is going to bed late and then she will feel groggy the next morning  Review of Systems   Constitutional: Negative for activity change, appetite change and unexpected weight change  Respiratory: Negative for cough, shortness of breath and wheezing  Cardiovascular: Positive for chest pain  Negative for palpitations and leg swelling  Gastrointestinal: Negative for abdominal distention, abdominal pain, blood in stool, constipation, diarrhea and nausea  Psychiatric/Behavioral: Positive for sleep disturbance  Negative for dysphoric mood  The patient is not nervous/anxious  Current Outpatient Medications on File Prior to Visit   Medication Sig   • buPROPion (WELLBUTRIN SR) 150 mg 12 hr tablet Take 1 tablet (150 mg total) by mouth 2 (two) times a day   • furosemide (LASIX) 20 mg tablet TAKE 1 TABLET (20 MG TOTAL) BY MOUTH AS NEEDED (EDEMA)   • meloxicam (MOBIC) 15 mg tablet TAKE 1 TABLET (15 MG TOTAL) BY MOUTH DAILY  • omeprazole (PriLOSEC) 40 MG capsule TAKE 1 CAPSULE BY MOUTH EVERY DAY   • propranolol (INDERAL LA) 60 mg 24 hr capsule TAKE 1 CAPSULE BY MOUTH EVERY DAY   • triamcinolone (KENALOG) 0 1 % cream Apply topically 2 (two) times a day   • zolpidem (AMBIEN) 5 mg tablet Take 1 tablet (5 mg total) by mouth daily at bedtime as needed for sleep       Objective     /68   Pulse 70   Temp 98 6 °F (37 °C) (Tympanic)   Ht 5' 3\" (1 6 m)   Wt 90 6 kg (199 lb 12 8 oz)   SpO2 97%   BMI 35 39 kg/m²     Physical Exam  Vitals reviewed  Constitutional:       Comments: This is a 49-year-old white female who appears her stated age  The patient is pleasant, cooperative, and in no distress     HENT:      Head: " Normocephalic and atraumatic  Right Ear: Tympanic membrane, ear canal and external ear normal  There is no impacted cerumen  Left Ear: Tympanic membrane, ear canal and external ear normal  There is no impacted cerumen  Mouth/Throat:      Mouth: Mucous membranes are moist       Pharynx: Oropharynx is clear  No oropharyngeal exudate or posterior oropharyngeal erythema  Eyes:      General: No scleral icterus  Right eye: No discharge  Left eye: No discharge  Conjunctiva/sclera: Conjunctivae normal       Pupils: Pupils are equal, round, and reactive to light  Cardiovascular:      Rate and Rhythm: Normal rate and regular rhythm  Heart sounds: Normal heart sounds  No murmur heard  No friction rub  No gallop  Pulmonary:      Effort: Pulmonary effort is normal  No respiratory distress  Breath sounds: Normal breath sounds  No stridor  No wheezing, rhonchi or rales  Abdominal:      General: Bowel sounds are normal  There is no distension  Palpations: Abdomen is soft  Tenderness: There is no abdominal tenderness  There is no guarding  Comments: There is no hepatosplenomegaly   Musculoskeletal:      Cervical back: Neck supple  Comments: There is tenderness noted to palpation over the clavicles bilaterally with no swelling  No erythema or heat to the touch  No ecchymosis  No deformity  Patient also has tenderness to palpation over the costochondral joints  Lymphadenopathy:      Cervical: No cervical adenopathy  Psychiatric:         Mood and Affect: Mood normal          Behavior: Behavior normal          Thought Content:  Thought content normal          Judgment: Judgment normal      Extremities: Without cyanosis, clubbing, or edema    Jorge Camejo DO

## 2023-05-18 ENCOUNTER — TELEPHONE (OUTPATIENT)
Dept: ADMINISTRATIVE | Facility: OTHER | Age: 48
End: 2023-05-18

## 2023-05-18 NOTE — ASSESSMENT & PLAN NOTE
Patient has depression which is currently stable  She will continue bupropion 150 mg twice a day  Continue Ambien 5 mg at bedtime as needed for insomnia  I did discuss with the patient and her issues  I reminded her that she should only take Ambien if she has 7 or 8 hours to dedicate to sleep  Otherwise, she may wake up in the morning groggy or with a hangover  She should also never combine Ambien with alcohol  Did tell the patient that she can take melatonin on the evenings where she is unable to take the Ambien

## 2023-05-18 NOTE — TELEPHONE ENCOUNTER
----- Message from Parth Roldan sent at 5/18/2023  8:17 AM EDT -----  Regarding: CARE GAP REQUEST  05/18/23 8:17 AM    Hello, our patient No patient name on file  has had Mammogram completed/performed  Please assist in updating the patient chart by pulling the Care Everywhere (CE) document  The date of service is 2/22/2023       Thank you,  [address]

## 2023-05-18 NOTE — ASSESSMENT & PLAN NOTE
This patient has reproducibility of her chest pain over her costochondral joints  She likely has costochondritis  Fibromyalgia may be contributing to her symptoms as well  Really no reason she should have tenderness to palpation over the clavicles  She admits that she only has pain if she presses on the area  Therefore, no treatment is required

## 2023-05-18 NOTE — ASSESSMENT & PLAN NOTE
Patient has hypertension  I checked her blood pressure myself and found her blood pressure to be 134/68  Patient will continue propranolol, which is also being used for migraine prophylaxis

## 2023-05-18 NOTE — TELEPHONE ENCOUNTER
Upon review of the In Basket request we were able to locate, review, and update the patient chart as requested for Mammogram     Any additional questions or concerns should be emailed to the Practice Liaisons via the appropriate education email address, please do not reply via In Basket      Thank you  Cecily Gaytan

## 2023-07-16 PROBLEM — Z12.11 COLON CANCER SCREENING: Status: RESOLVED | Noted: 2023-05-17 | Resolved: 2023-07-16

## 2023-07-18 ENCOUNTER — TELEPHONE (OUTPATIENT)
Age: 48
End: 2023-07-18

## 2023-07-18 ENCOUNTER — PREP FOR PROCEDURE (OUTPATIENT)
Age: 48
End: 2023-07-18

## 2023-07-18 DIAGNOSIS — Z12.11 COLON CANCER SCREENING: Primary | ICD-10-CM

## 2023-07-18 DIAGNOSIS — Z12.11 SCREENING FOR COLON CANCER: Primary | ICD-10-CM

## 2023-07-18 NOTE — TELEPHONE ENCOUNTER
07/18/23  Screened by: [facility]    Referring Provider     Pre- Screening: There is no height or weight on file to calculate BMI. Has patient been referred for a routine screening Colonoscopy? yes  Is the patient between 43-73 years old? yes      Previous Colonoscopy no   If yes:    Date:     Facility:     Reason:       SCHEDULING STAFF: If the patient is between 45yrs-49yrs, please advise patient to confirm benefits/coverage with their insurance company for a routine screening colonoscopy, some insurance carriers will only cover at [address] [place] [address] or older. If the patient is over 66years old, please schedule an office visit. Does the patient want to see a Gastroenterologist prior to their procedure OR are they having any GI symptoms? no    Has the patient been hospitalized or had abdominal surgery in the past 6 months? no    Does the patient use supplemental oxygen? no    Does the patient take Coumadin, Lovenox, Plavix, Elliquis, Xarelto, or other blood thinning medication? no    Has the patient had a stroke, cardiac event, or stent placed in the past year? no    SCHEDULING STAFF: If patient answers NO to above questions, then schedule procedure. If patient answers YES to above questions, then schedule office appointment. PASSED OA    If patient is between 45yrs - 49yrs, please advise patient that we will have to confirm benefits & coverage with their insurance company for a routine screening colonoscopy.

## 2023-07-18 NOTE — TELEPHONE ENCOUNTER
Scheduled date of colonoscopy (as of today):  8/9/23  Physician performing colonoscopy: DR Hemalatha Cordon  Location of colonoscopy: [facility]  Instructions reviewed with patient by: SENT GOLYT[facility] TO [facility]  Clearances: N/A

## 2023-08-06 DIAGNOSIS — F32.0 MAJOR DEPRESSIVE DISORDER, SINGLE EPISODE, MILD (HCC): ICD-10-CM

## 2023-08-06 DIAGNOSIS — K21.9 GASTROESOPHAGEAL REFLUX DISEASE: ICD-10-CM

## 2023-08-06 RX ORDER — OMEPRAZOLE 40 MG/1
CAPSULE, DELAYED RELEASE ORAL
Qty: 90 CAPSULE | Refills: 3 | Status: SHIPPED | OUTPATIENT
Start: 2023-08-06

## 2023-08-06 RX ORDER — BUPROPION HYDROCHLORIDE 150 MG/1
150 TABLET, EXTENDED RELEASE ORAL 2 TIMES DAILY
Qty: 180 TABLET | Refills: 2 | Status: SHIPPED | OUTPATIENT
Start: 2023-08-06

## 2023-08-09 ENCOUNTER — ANESTHESIA (OUTPATIENT)
Dept: PERIOP | Facility: HOSPITAL | Age: 48
End: 2023-08-09

## 2023-08-09 ENCOUNTER — ANESTHESIA EVENT (OUTPATIENT)
Dept: PERIOP | Facility: HOSPITAL | Age: 48
End: 2023-08-09

## 2023-08-09 ENCOUNTER — HOSPITAL ENCOUNTER (OUTPATIENT)
Dept: PERIOP | Facility: HOSPITAL | Age: 48
Setting detail: OUTPATIENT SURGERY
Discharge: HOME | End: 2023-08-09
Attending: STUDENT IN AN ORGANIZED HEALTH CARE EDUCATION/TRAINING PROGRAM
Payer: COMMERCIAL

## 2023-08-09 VITALS
HEART RATE: 62 BPM | TEMPERATURE: 98 F | HEIGHT: 63 IN | OXYGEN SATURATION: 96 % | DIASTOLIC BLOOD PRESSURE: 61 MMHG | RESPIRATION RATE: 16 BRPM | SYSTOLIC BLOOD PRESSURE: 104 MMHG | WEIGHT: 199 LBS | BODY MASS INDEX: 35.26 KG/M2

## 2023-08-09 DIAGNOSIS — Z12.11 SCREENING FOR COLON CANCER: ICD-10-CM

## 2023-08-09 PROCEDURE — 88305 TISSUE EXAM BY PATHOLOGIST: CPT | Performed by: PATHOLOGY

## 2023-08-09 RX ORDER — LIDOCAINE HYDROCHLORIDE 10 MG/ML
INJECTION, SOLUTION EPIDURAL; INFILTRATION; INTRACAUDAL; PERINEURAL AS NEEDED
Status: DISCONTINUED | OUTPATIENT
Start: 2023-08-09 | End: 2023-08-09

## 2023-08-09 RX ORDER — ONDANSETRON 2 MG/ML
4 INJECTION INTRAMUSCULAR; INTRAVENOUS ONCE AS NEEDED
Status: DISCONTINUED | OUTPATIENT
Start: 2023-08-09 | End: 2023-08-13 | Stop reason: HOSPADM

## 2023-08-09 RX ORDER — PROPOFOL 10 MG/ML
INJECTION, EMULSION INTRAVENOUS AS NEEDED
Status: DISCONTINUED | OUTPATIENT
Start: 2023-08-09 | End: 2023-08-09

## 2023-08-09 RX ORDER — SODIUM CHLORIDE, SODIUM LACTATE, POTASSIUM CHLORIDE, CALCIUM CHLORIDE 600; 310; 30; 20 MG/100ML; MG/100ML; MG/100ML; MG/100ML
INJECTION, SOLUTION INTRAVENOUS CONTINUOUS PRN
Status: DISCONTINUED | OUTPATIENT
Start: 2023-08-09 | End: 2023-08-09

## 2023-08-09 RX ADMIN — LIDOCAINE HYDROCHLORIDE 50 MG: 10 INJECTION, SOLUTION EPIDURAL; INFILTRATION; INTRACAUDAL; PERINEURAL at 10:58

## 2023-08-09 RX ADMIN — PROPOFOL 100 MCG/KG/MIN: 10 INJECTION, EMULSION INTRAVENOUS at 11:00

## 2023-08-09 RX ADMIN — PROPOFOL 100 MG: 10 INJECTION, EMULSION INTRAVENOUS at 10:58

## 2023-08-09 RX ADMIN — PROPOFOL 50 MG: 10 INJECTION, EMULSION INTRAVENOUS at 10:59

## 2023-08-09 RX ADMIN — SODIUM CHLORIDE, SODIUM LACTATE, POTASSIUM CHLORIDE, AND CALCIUM CHLORIDE: .6; .31; .03; .02 INJECTION, SOLUTION INTRAVENOUS at 10:51

## 2023-08-09 NOTE — ANESTHESIA POSTPROCEDURE EVALUATION
Post-Op Assessment Note    CV Status:  Stable  Pain Score: 0    Pain management: adequate     Mental Status:  Sleepy and arousable   Hydration Status:  Euvolemic   PONV Controlled:  Controlled   Airway Patency:  Patent      Post Op Vitals Reviewed: Yes      Staff: Anesthesiologist, CRNA         No notable events documented.     BP (!) 89/51 (08/09/23 1119)    Temp 98.5 °F (36.9 °C) (08/09/23 1119)    Pulse 66 (08/09/23 1119)   Resp 20 (08/09/23 1119)    SpO2 96 % (08/09/23 1119)

## 2023-08-09 NOTE — H&P
[facility]  History & Physical     PATIENT INFO     Name: Divya Mcclelland  YOB: 1975   Age: 52 y.o. Sex: female   MRN: [id number]     HISTORY OF PRESENT ILLNESS     Divya Mcclelland is a 52y.o. year old female who presents for screening colonoscopy. No prior colonoscopy. Not on antiplatelets or anticoagulants. REVIEW OF SYSTEMS     Per the HPI, and otherwise unremarkable.     Historical Information   Past Medical History:   Diagnosis Date   • DUB (dysfunctional uterine bleeding)    • Fibromyalgia, primary    • GERD (gastroesophageal reflux disease)    • Hepatic steatosis    • Hx of migraine headaches    • Hypertension    • IBS (irritable bowel syndrome)    • LONGORIA (nonalcoholic steatohepatitis)      Past Surgical History:   Procedure Laterality Date   • ABLATION SOFT TISSUE     • ENDOMETRIAL ABLATION     • OTHER SURGICAL HISTORY      uterine ablation   • TUBAL LIGATION       Social History   Social History     Substance and Sexual Activity   Alcohol Use Yes    Comment: occasional     Social History     Substance and Sexual Activity   Drug Use Never     Social History     Tobacco Use   Smoking Status Former   • Packs/day: 1.00   • Years: 28.00   • Total pack years: 28.00   • Types: Cigarettes   • Start date: 12   • Quit date: 6/22/2020   • Years since quitting: 3.1   Smokeless Tobacco Never     Family History   Problem Relation Age of Onset   • Hypertension Mother    • Diabetes Mother    • Uterine cancer Mother    • Hypertension Father    • Diabetes Father    • No Known Problems Sister    • No Known Problems Maternal Grandfather    • Colon cancer Paternal Grandmother    • Lung cancer Paternal Grandfather    • No Known Problems Maternal Aunt    • No Known Problems Maternal Aunt    • Breast cancer Maternal Aunt         Early 40's   • No Known Problems Paternal Aunt    • No Known Problems Paternal Aunt         MEDICATIONS & ALLERGIES     Current Outpatient Medications Medication Instructions   • buPROPion (WELLBUTRIN SR) 150 mg, Oral, 2 times daily   • furosemide (LASIX) 20 mg, Oral, As needed   • meloxicam (MOBIC) 15 mg, Oral, Daily   • omeprazole (PriLOSEC) 40 MG capsule TAKE 1 CAPSULE BY MOUTH EVERY DAY   • polyethylene glycol (GOLYTELY) 4000 mL solution 4,000 mL, Oral, Once   • propranolol (INDERAL LA) 60 mg 24 hr capsule TAKE 1 CAPSULE BY MOUTH EVERY DAY   • triamcinolone (KENALOG) 0.1 % cream Topical, 2 times daily   • zolpidem (AMBIEN) 5 mg, Oral, Daily at bedtime PRN     No Known Allergies     PHYSICAL EXAM      Objective   Blood pressure 126/70, pulse 67, temperature 98.4 °F (36.9 °C), temperature source Tympanic, resp. rate 18, height 5' 3" (1.6 m), weight 90.3 kg (199 lb), SpO2 95 %, not currently breastfeeding. Body mass index is 35.25 kg/m². General Appearance:   Alert, cooperative, no distress   Lungs:   Equal chest rise, respirations unlabored    Heart:   Regular rate and rhythm   Abdomen:   Soft, non-tender, non-distended; normal bowel sounds; no masses, no organomegaly    Extremities:   No edema       ASSESSMENT & PLAN     This is a 52y.o. year old female here for screening colonoscopy, and she is stable and optimized for her procedure. Gallito Michaels D.O. [address] [facility]  Division of Gastroenterology & Hepatology  Available on [email or web address]    ** Please Note: This note is constructed using a voice recognition dictation system.  **

## 2023-08-09 NOTE — ANESTHESIA PREPROCEDURE EVALUATION
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      MUSCULOSKELETAL   (+) Acute right-sided thoracic back pain   (+) Fibromyalgia   (+) Lumbar spondylosis   (+) Primary generalized (osteo)arthritis   (+) Thoracic myofascial strain      NEURO/PSYCH   (+) Depression, recurrent (HCC)   (+) Fibromyalgia   (+) Major depressive disorder, single episode, mild (HCC)   (+) Migraine without aura and without status migrainosus, not intractable      PULMONARY   (+) Shortness of breath        Physical Exam    Airway    Mallampati score: II  TM Distance: >3 FB  Neck ROM: full     Dental       Cardiovascular      Pulmonary      Other Findings        Anesthesia Plan  ASA Score- 2     Anesthesia Type- IV sedation with anesthesia with ASA Monitors. Additional Monitors:   Airway Plan:           Plan Factors-    Chart reviewed. Induction- intravenous. Postoperative Plan-     Informed Consent- Anesthetic plan and risks discussed with patient. I personally reviewed this patient with the CRNA. Discussed and agreed on the Anesthesia Plan with the CRNA. Loy Pinto

## 2023-08-11 PROCEDURE — 88305 TISSUE EXAM BY PATHOLOGIST: CPT | Performed by: PATHOLOGY

## 2023-09-12 ENCOUNTER — VBI (OUTPATIENT)
Dept: ADMINISTRATIVE | Facility: OTHER | Age: 48
End: 2023-09-12

## 2023-10-08 DIAGNOSIS — I10 HYPERTENSION, UNSPECIFIED TYPE: ICD-10-CM

## 2023-10-08 DIAGNOSIS — M15.0 PRIMARY GENERALIZED (OSTEO)ARTHRITIS: ICD-10-CM

## 2023-10-08 RX ORDER — MELOXICAM 15 MG/1
15 TABLET ORAL DAILY
Qty: 90 TABLET | Refills: 1 | Status: SHIPPED | OUTPATIENT
Start: 2023-10-08 | End: 2024-04-05

## 2023-10-08 RX ORDER — PROPRANOLOL HCL 60 MG
CAPSULE, EXTENDED RELEASE 24HR ORAL
Qty: 90 CAPSULE | Refills: 1 | Status: SHIPPED | OUTPATIENT
Start: 2023-10-08

## 2023-11-04 DIAGNOSIS — R60.9 EDEMA, UNSPECIFIED TYPE: ICD-10-CM

## 2023-11-06 ENCOUNTER — RA CDI HCC (OUTPATIENT)
Dept: OTHER | Facility: HOSPITAL | Age: 48
End: 2023-11-06

## 2023-11-06 RX ORDER — FUROSEMIDE 20 MG/1
20 TABLET ORAL AS NEEDED
Qty: 90 TABLET | Refills: 1 | Status: SHIPPED | OUTPATIENT
Start: 2023-11-06

## 2023-11-06 NOTE — PROGRESS NOTES
[address] [facility] coding opportunities          Chart Reviewed number of suggestions sent to Provider: 1  E66.01     Patients Insurance        Commercial Insurance: [employer]

## 2023-12-07 ENCOUNTER — OFFICE VISIT (OUTPATIENT)
Dept: FAMILY MEDICINE CLINIC | Facility: CLINIC | Age: 48
End: 2023-12-07
Payer: COMMERCIAL

## 2023-12-07 VITALS
HEIGHT: 63 IN | OXYGEN SATURATION: 99 % | DIASTOLIC BLOOD PRESSURE: 78 MMHG | BODY MASS INDEX: 35.08 KG/M2 | SYSTOLIC BLOOD PRESSURE: 148 MMHG | WEIGHT: 198 LBS | TEMPERATURE: 98.4 F | HEART RATE: 65 BPM

## 2023-12-07 DIAGNOSIS — Z79.899 ENCOUNTER FOR LONG-TERM (CURRENT) USE OF MEDICATIONS: ICD-10-CM

## 2023-12-07 DIAGNOSIS — R13.10 DYSPHAGIA, UNSPECIFIED TYPE: Primary | ICD-10-CM

## 2023-12-07 DIAGNOSIS — G43.009 MIGRAINE WITHOUT AURA AND WITHOUT STATUS MIGRAINOSUS, NOT INTRACTABLE: ICD-10-CM

## 2023-12-07 DIAGNOSIS — K76.0 NONALCOHOLIC FATTY LIVER DISEASE: ICD-10-CM

## 2023-12-07 DIAGNOSIS — M15.0 PRIMARY GENERALIZED (OSTEO)ARTHRITIS: ICD-10-CM

## 2023-12-07 DIAGNOSIS — E78.5 DYSLIPIDEMIA: ICD-10-CM

## 2023-12-07 DIAGNOSIS — Z23 ENCOUNTER FOR IMMUNIZATION: ICD-10-CM

## 2023-12-07 DIAGNOSIS — F32.0 MAJOR DEPRESSIVE DISORDER, SINGLE EPISODE, MILD (HCC): ICD-10-CM

## 2023-12-07 PROCEDURE — 99214 OFFICE O/P EST MOD 30 MIN: CPT | Performed by: FAMILY MEDICINE

## 2023-12-07 PROCEDURE — 90471 IMMUNIZATION ADMIN: CPT

## 2023-12-07 PROCEDURE — 90686 IIV4 VACC NO PRSV 0.5 ML IM: CPT

## 2023-12-07 RX ORDER — CELECOXIB 200 MG/1
200 CAPSULE ORAL DAILY
Qty: 90 CAPSULE | Refills: 1 | Status: SHIPPED | OUTPATIENT
Start: 2023-12-07

## 2023-12-07 NOTE — ASSESSMENT & PLAN NOTE
Patient has migraine headaches which are currently stable on propranolol 60 mg once a day. I will have her continue this dose unchanged.

## 2023-12-07 NOTE — PROGRESS NOTES
Name: Nathen Sotomayor      : 1975      MRN: [id number]  Encounter Provider: Augustin De La O DO  Encounter Date: 2023   Encounter department: [address] [place] [address] PRIMARY CARE    Assessment & Plan     1. Dysphagia, unspecified type  Assessment & Plan:  Patient has dysphagia and some odynophagia as well. I reviewed her EGD from . She reports compliance with her omeprazole. I am going to refer the patient to gastroenterology for further evaluation. Orders:  -     Ambulatory Referral to Gastroenterology; Future    2. Nonalcoholic fatty liver disease  Assessment & Plan:  We discussed the patient's diagnosis of nonalcoholic fatty liver disease. As the patient that she needs to lose weight in order to prevent progression of her liver disease. She needs to keep her cholesterol and triglycerides down. Unfortunately, lab did not run the CMP as this had . I reordered a CMP. Will also assess her liver enzymes and a CMP. Orders:  -     Comprehensive metabolic panel; Future    3. Encounter for long-term (current) use of medications  -     CBC and differential; Future    4. Primary generalized (osteo)arthritis  Assessment & Plan:  Patient is currently taking meloxicam 15 mg daily. She tells me she no longer sees any difference whether she takes the medication or not. She complains of joint pain and has seen rheumatology in the past and been diagnosed with fibromyalgia. She request a different medication. Patient was started on Celebrex 200 mg. She will take 1 daily with food. Orders:  -     celecoxib (CeleBREX) 200 mg capsule; Take 1 capsule (200 mg total) by mouth daily    5. Encounter for immunization  -     influenza vaccine, quadrivalent, 0.5 mL, preservative-free, for adult and pediatric patients 6 mos+ (AFLURIA, FLUARIX, FLULAVAL, FLUZONE)    6.  Migraine without aura and without status migrainosus, not intractable  Assessment & Plan:  Patient has migraine headaches which are currently stable on propranolol 60 mg once a day. I will have her continue this dose unchanged. 7. Major depressive disorder, single episode, mild (HCC)  Assessment & Plan:  Pressure is currently stable. Patient will continue bupropion 150 mg twice daily. Patient has found the medication to be effective in treating her symptoms. 8. Dyslipidemia  Assessment & Plan:  I reviewed with the patient her lipid panel. For now, I have  not recommended medication. I am hopeful we can get this down through diet and exercise. I will recheck this when she returns in . BMI Counseling: Body mass index is 35.07 kg/m². The BMI is above normal. Nutrition recommendations include decreasing portion sizes. Exercise recommendations include exercising 3-5 times per week. Rationale for BMI follow-up plan is due to patient being overweight or obese. Subjective      Patient is a 63-year-old white female who presents to the office today for routine checkup. The patient is doing well and has no complaints. The patient reports compliance with her medication. She does walk for exercise. She is looking forward to the gym opening in town. Patient tells me she did go for blood work. Unfortunately, she tells me the lab only ran 1 test.  She tells me that the other lab test that I had ordered were . Her main complaint today is she is developing dysphagia again and she also admits to some odynophagia. It occurs with food as well as even with beverages. Review of Systems   Constitutional:  Negative for activity change and appetite change. HENT:  Positive for trouble swallowing. Occasional oodynophagia   Respiratory:  Negative for cough, shortness of breath and wheezing. Cardiovascular:  Negative for chest pain, palpitations and leg swelling. Gastrointestinal:  Negative for abdominal distention, abdominal pain, blood in stool, constipation, diarrhea and nausea.        Current Outpatient Medications on File Prior to Visit   Medication Sig    buPROPion (WELLBUTRIN SR) 150 mg 12 hr tablet TAKE 1 TABLET BY MOUTH TWICE A DAY    furosemide (LASIX) 20 mg tablet TAKE 1 TABLET (20 MG TOTAL) BY MOUTH AS NEEDED (EDEMA)    omeprazole (PriLOSEC) 40 MG capsule TAKE 1 CAPSULE BY MOUTH EVERY DAY    propranolol (INDERAL LA) 60 mg 24 hr capsule TAKE 1 CAPSULE BY MOUTH EVERY DAY    zolpidem (AMBIEN) 5 mg tablet Take 1 tablet (5 mg total) by mouth daily at bedtime as needed for sleep    [DISCONTINUED] meloxicam (MOBIC) 15 mg tablet TAKE 1 TABLET (15 MG TOTAL) BY MOUTH DAILY. [DISCONTINUED] triamcinolone (KENALOG) 0.1 % cream Apply topically 2 (two) times a day       Objective     /78   Pulse 65   Temp 98.4 °F (36.9 °C) (Tympanic)   Ht 5' 3" (1.6 m)   Wt 89.8 kg (198 lb)   SpO2 99%   BMI 35.07 kg/m²     Physical Exam  Vitals reviewed. Constitutional:       Comments: This is a 77-year-old white female who appears her stated age. The patient is pleasant, cooperative, and in no distress. HENT:      Head: Normocephalic and atraumatic. Right Ear: Tympanic membrane, ear canal and external ear normal. There is no impacted cerumen. Left Ear: Tympanic membrane, ear canal and external ear normal. There is no impacted cerumen. Mouth/Throat:      Mouth: Mucous membranes are moist.      Pharynx: Oropharynx is clear. No oropharyngeal exudate or posterior oropharyngeal erythema. Eyes:      General: No scleral icterus. Right eye: No discharge. Left eye: No discharge. Conjunctiva/sclera: Conjunctivae normal.      Pupils: Pupils are equal, round, and reactive to light. Neck:      Comments: There is no thyromegaly  Cardiovascular:      Rate and Rhythm: Normal rate and regular rhythm. Heart sounds: Normal heart sounds. No murmur heard. No friction rub. No gallop. Pulmonary:      Effort: Pulmonary effort is normal. No respiratory distress.       Breath sounds: Normal breath sounds. No stridor. No wheezing, rhonchi or rales. Abdominal:      General: Bowel sounds are normal. There is no distension. Palpations: Abdomen is soft. There is no mass. Tenderness: There is no abdominal tenderness. There is no guarding. Comments: There is no hepatosplenomegaly   Musculoskeletal:      Cervical back: Neck supple. Lymphadenopathy:      Cervical: No cervical adenopathy. Psychiatric:         Mood and Affect: Mood normal.         Behavior: Behavior normal.         Thought Content:  Thought content normal.         Judgment: Judgment normal.     Extremities: Without cyanosis, clubbing, or edema    Shabbir Lights, DO

## 2023-12-07 NOTE — ASSESSMENT & PLAN NOTE
We discussed the patient's diagnosis of nonalcoholic fatty liver disease. As the patient that she needs to lose weight in order to prevent progression of her liver disease. She needs to keep her cholesterol and triglycerides down. Unfortunately, lab did not run the CMP as this had . I reordered a CMP. Will also assess her liver enzymes and a CMP. Detail Level: Detailed Detail Level: Simple Detail Level: Zone Detail Level: Generalized

## 2023-12-07 NOTE — ASSESSMENT & PLAN NOTE
I reviewed with the patient her lipid panel. For now, I have  not recommended medication. I am hopeful we can get this down through diet and exercise. I will recheck this when she returns in June.

## 2023-12-07 NOTE — ASSESSMENT & PLAN NOTE
Patient is currently taking meloxicam 15 mg daily. She tells me she no longer sees any difference whether she takes the medication or not. She complains of joint pain and has seen rheumatology in the past and been diagnosed with fibromyalgia. She request a different medication. Patient was started on Celebrex 200 mg. She will take 1 daily with food.

## 2023-12-07 NOTE — ASSESSMENT & PLAN NOTE
Pressure is currently stable. Patient will continue bupropion 150 mg twice daily. Patient has found the medication to be effective in treating her symptoms.

## 2023-12-07 NOTE — ASSESSMENT & PLAN NOTE
Patient has dysphagia and some odynophagia as well. I reviewed her EGD from 2020. She reports compliance with her omeprazole. I am going to refer the patient to gastroenterology for further evaluation.

## 2024-02-04 DIAGNOSIS — Z00.6 ENCOUNTER FOR EXAMINATION FOR NORMAL COMPARISON OR CONTROL IN CLINICAL RESEARCH PROGRAM: ICD-10-CM

## 2024-02-21 PROBLEM — J01.90 ACUTE NON-RECURRENT SINUSITIS: Status: RESOLVED | Noted: 2019-12-16 | Resolved: 2024-02-21

## 2024-03-01 ENCOUNTER — APPOINTMENT (OUTPATIENT)
Dept: LAB | Facility: CLINIC | Age: 49
End: 2024-03-01

## 2024-03-01 DIAGNOSIS — K76.0 NONALCOHOLIC FATTY LIVER DISEASE: ICD-10-CM

## 2024-03-01 DIAGNOSIS — Z00.6 ENCOUNTER FOR EXAMINATION FOR NORMAL COMPARISON OR CONTROL IN CLINICAL RESEARCH PROGRAM: ICD-10-CM

## 2024-03-01 DIAGNOSIS — E78.5 DYSLIPIDEMIA: ICD-10-CM

## 2024-03-01 DIAGNOSIS — Z79.899 ENCOUNTER FOR LONG-TERM (CURRENT) USE OF MEDICATIONS: ICD-10-CM

## 2024-03-01 PROCEDURE — 36415 COLL VENOUS BLD VENIPUNCTURE: CPT

## 2024-03-06 ENCOUNTER — OFFICE VISIT (OUTPATIENT)
Dept: FAMILY MEDICINE CLINIC | Facility: CLINIC | Age: 49
End: 2024-03-06
Payer: COMMERCIAL

## 2024-03-06 VITALS
TEMPERATURE: 97.5 F | SYSTOLIC BLOOD PRESSURE: 143 MMHG | WEIGHT: 195.7 LBS | DIASTOLIC BLOOD PRESSURE: 67 MMHG | BODY MASS INDEX: 34.68 KG/M2 | OXYGEN SATURATION: 98 % | HEIGHT: 63 IN | HEART RATE: 65 BPM

## 2024-03-06 DIAGNOSIS — K42.9 UMBILICAL HERNIA WITHOUT OBSTRUCTION AND WITHOUT GANGRENE: Primary | ICD-10-CM

## 2024-03-06 PROCEDURE — 99213 OFFICE O/P EST LOW 20 MIN: CPT | Performed by: FAMILY MEDICINE

## 2024-03-06 NOTE — PROGRESS NOTES
Name: Mallika Jenkins      : 1975      MRN: [id number]  Encounter Provider: Wally Abraham DO  Encounter Date: 3/6/2024   Encounter department: [facility] PRIMARY CARE    Assessment & Plan     1. Umbilical hernia without obstruction and without gangrene  Assessment & Plan:  Has a very small umbilical hernia.  At this time, I am simply recommending observation.  If it gets bigger, she should call me and I will need to refer her to general surgery.  If her pain should not worsen, or if it should not resolve, again, then I may need to consider referring her to a general surgeon.  At this point, we are going to observe it.  I recommended to the patient that she has no activity restrictions.  If she finds she is doing something that causes discomfort, that she should use commonsense and not do it.  Otherwise, I recommended normal activities.  Patient will call for any problems.             Subjective      This is a 48-year-old white female who presents to the office today complaining of a lump in her umbilicus.  Patient tells me that she leaned on her counter as she was reaching out for something on a shelf with her right arm.  She developed pain across her abdomen.  This occurred 5 days ago.  She tells me 2 days later, she felt a lump in her umbilicus.  She tells me if she palpates this lump she will feel the pain again and radiate across her abdomen.      Review of Systems   Gastrointestinal:  Positive for abdominal distention and abdominal pain. Negative for constipation, diarrhea, nausea and vomiting.       Current Outpatient Medications on File Prior to Visit   Medication Sig    buPROPion (WELLBUTRIN SR) 150 mg 12 hr tablet TAKE 1 TABLET BY MOUTH TWICE A DAY    celecoxib (CeleBREX) 200 mg capsule Take 1 capsule (200 mg total) by mouth daily    furosemide (LASIX) 20 mg tablet TAKE 1 TABLET (20 MG TOTAL) BY MOUTH AS NEEDED (EDEMA)    omeprazole (PriLOSEC) 40 MG capsule TAKE 1 CAPSULE BY MOUTH EVERY DAY     "propranolol (INDERAL LA) 60 mg 24 hr capsule TAKE 1 CAPSULE BY MOUTH EVERY DAY    zolpidem (AMBIEN) 5 mg tablet Take 1 tablet (5 mg total) by mouth daily at bedtime as needed for sleep       Objective     /67   Pulse 65   Temp 97.5 °F (36.4 °C) (Tympanic)   Ht 5' 3\" (1.6 m)   Wt 88.8 kg (195 lb 11.2 oz)   SpO2 98%   BMI 34.67 kg/m²     Physical Exam  Vitals reviewed.   Constitutional:       Comments: This is a 48-year-old white female who appears her stated age.  The patient is pleasant, cooperative, and in no distress   HENT:      Head: Normocephalic and atraumatic.   Cardiovascular:      Rate and Rhythm: Normal rate and regular rhythm.      Heart sounds: Normal heart sounds. No murmur heard.     No friction rub. No gallop.   Pulmonary:      Effort: Pulmonary effort is normal. No respiratory distress.      Breath sounds: Normal breath sounds. No stridor. No wheezing, rhonchi or rales.   Abdominal:      General: Bowel sounds are normal. There is no distension.      Palpations: Abdomen is soft. There is no mass.      Tenderness: There is no abdominal tenderness. There is no guarding.      Hernia: A hernia is present.      Comments: A very small reducible umbilical hernia is present.  I am only able to allow part of the tip of my fifth right finger into the hernia.       Wally Abraham, DO    "

## 2024-03-07 NOTE — ASSESSMENT & PLAN NOTE
Has a very small umbilical hernia.  At this time, I am simply recommending observation.  If it gets bigger, she should call me and I will need to refer her to general surgery.  If her pain should not worsen, or if it should not resolve, again, then I may need to consider referring her to a general surgeon.  At this point, we are going to observe it.  I recommended to the patient that she has no activity restrictions.  If she finds she is doing something that causes discomfort, that she should use commonsense and not do it.  Otherwise, I recommended normal activities.  Patient will call for any problems.

## 2024-04-10 DIAGNOSIS — I10 HYPERTENSION, UNSPECIFIED TYPE: ICD-10-CM

## 2024-04-10 RX ORDER — PROPRANOLOL HCL 60 MG
CAPSULE, EXTENDED RELEASE 24HR ORAL
Qty: 90 CAPSULE | Refills: 1 | Status: SHIPPED | OUTPATIENT
Start: 2024-04-10

## 2024-04-13 NOTE — ASSESSMENT & PLAN NOTE
Depression is controlled on bupropion 150 mg b i d   Patient is experiencing sleep disturbance  She is trying over-the-counter supplements to try to help with her sleep  She wants to try to avoid any prescription medications for sleep 
Migraines are currently controlled    Patient will continue her propranolol LA 60 mg daily
Patient has gastroesophageal reflux disease  Her symptoms are controlled on omeprazole 40 mg daily  I note that the patient is now taking Mobic 15 mg daily, prescribed by her rheumatologist   I told the patient she must tried not to miss any doses of her omeprazole as Mobic can cause ulcers  I think the omeprazole should have a gastro protective affect for her 
Patient has hypertension  I check her blood pressure myself and found it to be 130/86  The patient will continue propranolol, which is also being prescribed for migraine prophylaxis  The propranolol has worked well in controlling the patient's blood pressures 
Never

## 2024-04-16 LAB
APOB+LDLR+PCSK9 GENE MUT ANL BLD/T: NOT DETECTED
BRCA1+BRCA2 DEL+DUP + FULL MUT ANL BLD/T: NOT DETECTED
MLH1+MSH2+MSH6+PMS2 GN DEL+DUP+FUL M: NOT DETECTED

## 2024-05-02 DIAGNOSIS — R60.9 EDEMA, UNSPECIFIED TYPE: ICD-10-CM

## 2024-05-03 RX ORDER — FUROSEMIDE 20 MG/1
TABLET ORAL
Qty: 90 TABLET | Refills: 1 | Status: SHIPPED | OUTPATIENT
Start: 2024-05-03

## 2024-06-04 ENCOUNTER — OFFICE VISIT (OUTPATIENT)
Dept: URGENT CARE | Facility: CLINIC | Age: 49
End: 2024-06-04
Payer: COMMERCIAL

## 2024-06-04 VITALS
OXYGEN SATURATION: 96 % | RESPIRATION RATE: 18 BRPM | HEART RATE: 64 BPM | DIASTOLIC BLOOD PRESSURE: 66 MMHG | SYSTOLIC BLOOD PRESSURE: 123 MMHG | TEMPERATURE: 98.2 F

## 2024-06-04 DIAGNOSIS — R30.0 DYSURIA: ICD-10-CM

## 2024-06-04 DIAGNOSIS — N39.0 URINARY TRACT INFECTION WITHOUT HEMATURIA, SITE UNSPECIFIED: Primary | ICD-10-CM

## 2024-06-04 PROCEDURE — 99214 OFFICE O/P EST MOD 30 MIN: CPT | Performed by: NURSE PRACTITIONER

## 2024-06-04 PROCEDURE — 87086 URINE CULTURE/COLONY COUNT: CPT | Performed by: NURSE PRACTITIONER

## 2024-06-04 RX ORDER — CEPHALEXIN 500 MG/1
500 CAPSULE ORAL EVERY 12 HOURS SCHEDULED
Qty: 14 CAPSULE | Refills: 0 | Status: SHIPPED | OUTPATIENT
Start: 2024-06-04 | End: 2024-06-11

## 2024-06-04 NOTE — PATIENT INSTRUCTIONS
Your urine shows bacteria.  You have been prescribed an antibiotic. You are to take all medication as prescribed.   You are to avoid alcohol and caffeine - they are bladder irritants.  Drink water.  Take tylenol or motrin for pain or fever.    You are to download [facility] for the results in 3-5 days.   You will be notified if the antibiotic needs changed.  Follow up with your PCP in 2-3 days.   Go to the ED if symptoms worsen.      You have been prescribed cephalexin . Take all the antibiotic    If tests have been performed at [facility], our office will contact you with results if changes need to be made to the care plan discussed with you at the visit.  You can review your full results on [facility].

## 2024-06-04 NOTE — PROGRESS NOTES
"  [facility]        NAME: Mallika Jenkins is a 48 y.o. female  : 1975    MRN: [id number]  DATE: 2024  TIME: 12:02 PM    Assessment and Plan   Urinary tract infection without hematuria, site unspecified [N39.0]  1. Urinary tract infection without hematuria, site unspecified  Urine culture    cephalexin (KEFLEX) 500 mg capsule      2. Dysuria  cephalexin (KEFLEX) 500 mg capsule            Patient Instructions       Follow up with PCP in 3-5 days.  Proceed to  ER if symptoms worsen.    If tests have been performed at [facility], our office will contact you with results if changes need to be made to the care plan discussed with you at the visit.  You can review your full results on [facility].    Your urine shows bacteria.  You have been prescribed an antibiotic. You are to take all medication as prescribed.   You are to avoid alcohol and caffeine - they are bladder irritants.  Drink water.  Take tylenol or motrin for pain or fever.    You are to download [facility] mychart for the results in 3-5 days.   You will be notified if the antibiotic needs changed.  Follow up with your PCP in 2-3 days.   Go to the ED if symptoms worsen.      You have been prescribed cephalexin . Take all the antibiotic    If tests have been performed at [facility], our office will contact you with results if changes need to be made to the care plan discussed with you at the visit.  You can review your full results on [facility].        Chief Complaint     Chief Complaint   Patient presents with    Urinary Tract Infection     C/o urinary frequency, dysuria,intermittent hematuria,  and \"bladder spasms\" onset \"Saturday\". Pt reports utilizing \"Azo\", not improving. Denies fever. Denies flank/lower back pain.          History of Present Illness       This is a 48 year old female who states has had dysuria with urinary \"tingling\" x 3 days. States on Saturday seemed to have some hematuria. She has been taking Azo with relief " but when it wears off she then develops pain again. Denies fevers, chills, n/v/d, vaginal discharge or bleeding.  PMH is listed.  Denies pregnancy.     Urinary Tract Infection   Associated symptoms include hematuria, nausea and urgency. Pertinent negatives include no chills, discharge, flank pain, frequency, hesitancy, possible pregnancy, sweats or vomiting.   Difficulty Urinating   This is a new problem. The current episode started in the past 7 days. The problem occurs every urination. The problem has been gradually worsening. The quality of the pain is described as aching and burning. The pain is at a severity of 6/10. The pain is moderate. There has been no fever. She is Sexually active. There is No history of pyelonephritis. Associated symptoms include hematuria, nausea and urgency. Pertinent negatives include no chills, discharge, flank pain, frequency, hesitancy, possible pregnancy, sweats or vomiting.       Review of Systems   Review of Systems   Constitutional: Negative.  Negative for chills.   HENT: Negative.     Eyes: Negative.    Respiratory: Negative.     Cardiovascular: Negative.    Gastrointestinal:  Positive for nausea. Negative for vomiting.   Endocrine: Negative.    Genitourinary:  Positive for dysuria, hematuria and urgency. Negative for flank pain, frequency and hesitancy.   Musculoskeletal: Negative.    Skin: Negative.    Allergic/Immunologic: Negative.    Neurological: Negative.    Hematological: Negative.    Psychiatric/Behavioral: Negative.           Current Medications       Current Outpatient Medications:     cephalexin (KEFLEX) 500 mg capsule, Take 1 capsule (500 mg total) by mouth every 12 (twelve) hours for 7 days, Disp: 14 capsule, Rfl: 0    buPROPion (WELLBUTRIN SR) 150 mg 12 hr tablet, TAKE 1 TABLET BY MOUTH TWICE A DAY, Disp: 180 tablet, Rfl: 2    celecoxib (CeleBREX) 200 mg capsule, Take 1 capsule (200 mg total) by mouth daily, Disp: 90 capsule, Rfl: 1    furosemide (LASIX) 20 mg  tablet, TAKE ONE TABLET BY MOUTH AS NEEDED FOR EDEMA, Disp: 90 tablet, Rfl: 1    omeprazole (PriLOSEC) 40 MG capsule, TAKE 1 CAPSULE BY MOUTH EVERY DAY, Disp: 90 capsule, Rfl: 3    propranolol (INDERAL LA) 60 mg 24 hr capsule, TAKE 1 CAPSULE BY MOUTH EVERY DAY, Disp: 90 capsule, Rfl: 1    zolpidem (AMBIEN) 5 mg tablet, Take 1 tablet (5 mg total) by mouth daily at bedtime as needed for sleep, Disp: 30 tablet, Rfl: 2    Current Allergies     Allergies as of 06/04/2024    (No Known Allergies)            The following portions of the patient's history were reviewed and updated as appropriate: allergies, current medications, past family history, past medical history, past social history, past surgical history and problem list.     Past Medical History:   Diagnosis Date    DUB (dysfunctional uterine bleeding)     Fibromyalgia, primary     GERD (gastroesophageal reflux disease)     Hepatic steatosis     Hx of migraine headaches     Hypertension     IBS (irritable bowel syndrome)     LONGORIA (nonalcoholic steatohepatitis)        Past Surgical History:   Procedure Laterality Date    ABLATION SOFT TISSUE      ENDOMETRIAL ABLATION      OTHER SURGICAL HISTORY      uterine ablation    TUBAL LIGATION         Family History   Problem Relation Age of Onset    Hypertension Mother     Diabetes Mother     Uterine cancer Mother     Hypertension Father     Diabetes Father     No Known Problems Sister     No Known Problems Maternal Grandfather     Colon cancer Paternal Grandmother     Lung cancer Paternal Grandfather     No Known Problems Maternal Aunt     No Known Problems Maternal Aunt     Breast cancer Maternal Aunt         Early 40's    No Known Problems Paternal Aunt     No Known Problems Paternal Aunt          Medications have been verified.        Objective   /66 (BP Location: Left arm, Patient Position: Sitting, Cuff Size: Large)   Pulse 64   Temp 98.2 °F (36.8 °C) (Temporal)   Resp 18   SpO2 96%   No LMP recorded. Patient  has had an ablation.       Physical Exam     Physical Exam  Vitals and nursing note reviewed.   Constitutional:       General: She is not in acute distress.     Appearance: Normal appearance. She is obese. She is not ill-appearing, toxic-appearing or diaphoretic.   HENT:      Head: Normocephalic and atraumatic.   Eyes:      Extraocular Movements: Extraocular movements intact.   Cardiovascular:      Rate and Rhythm: Normal rate and regular rhythm.      Pulses: Normal pulses.      Heart sounds: Normal heart sounds. No murmur heard.  Pulmonary:      Effort: Pulmonary effort is normal. No respiratory distress.      Breath sounds: Normal breath sounds. No stridor. No wheezing, rhonchi or rales.   Chest:      Chest wall: No tenderness.   Musculoskeletal:         General: Normal range of motion.      Cervical back: Normal range of motion.   Skin:     General: Skin is warm and dry.      Capillary Refill: Capillary refill takes less than 2 seconds.   Neurological:      General: No focal deficit present.      Mental Status: She is alert and oriented to person, place, and time.   Psychiatric:         Mood and Affect: Mood normal.         Behavior: Behavior normal.         Thought Content: Thought content normal.         Judgment: Judgment normal.               Unable to perform POCT due to azo use - urine is orange  Will send for cx due to symptoms

## 2024-06-06 LAB — BACTERIA UR CULT: NORMAL

## 2024-06-08 DIAGNOSIS — M15.0 PRIMARY GENERALIZED (OSTEO)ARTHRITIS: ICD-10-CM

## 2024-06-11 ENCOUNTER — TELEPHONE (OUTPATIENT)
Dept: URGENT CARE | Facility: CLINIC | Age: 49
End: 2024-06-11

## 2024-06-11 RX ORDER — CELECOXIB 200 MG/1
200 CAPSULE ORAL DAILY
Qty: 90 CAPSULE | Refills: 1 | Status: SHIPPED | OUTPATIENT
Start: 2024-06-11

## 2024-06-11 NOTE — TELEPHONE ENCOUNTER
Pt returned call.  Negative culture results reviewed and discussed with pt.  She states that she is still having symptoms after completing 2 day course of Azo and keflex course.   Informed her to call PCP as she may need uro/gyn or urology referral/consult. She verbalizes understanding.

## 2024-06-11 NOTE — RESULT ENCOUNTER NOTE
Urine Culture   >100,000 cfu/ml  Mixed Contaminants X5  Resembling mixed skin and/or urogenital rosa    Pt was prescribed cephalexin.  LM on VM for pt to call office to discuss    Please have your office call patient for follow up

## 2024-06-13 ENCOUNTER — OFFICE VISIT (OUTPATIENT)
Dept: FAMILY MEDICINE CLINIC | Facility: CLINIC | Age: 49
End: 2024-06-13
Payer: COMMERCIAL

## 2024-06-13 VITALS
OXYGEN SATURATION: 96 % | HEIGHT: 63 IN | DIASTOLIC BLOOD PRESSURE: 76 MMHG | WEIGHT: 201.5 LBS | BODY MASS INDEX: 35.7 KG/M2 | SYSTOLIC BLOOD PRESSURE: 132 MMHG | TEMPERATURE: 97.6 F | HEART RATE: 65 BPM

## 2024-06-13 DIAGNOSIS — E78.5 DYSLIPIDEMIA: ICD-10-CM

## 2024-06-13 DIAGNOSIS — I10 ESSENTIAL HYPERTENSION: Primary | ICD-10-CM

## 2024-06-13 DIAGNOSIS — R53.83 OTHER FATIGUE: ICD-10-CM

## 2024-06-13 DIAGNOSIS — G43.009 MIGRAINE WITHOUT AURA AND WITHOUT STATUS MIGRAINOSUS, NOT INTRACTABLE: ICD-10-CM

## 2024-06-13 DIAGNOSIS — F32.0 MAJOR DEPRESSIVE DISORDER, SINGLE EPISODE, MILD (HCC): ICD-10-CM

## 2024-06-13 PROBLEM — M35.9 UNDIFFERENTIATED CONNECTIVE TISSUE DISEASE (HCC): Status: RESOLVED | Noted: 2022-03-02 | Resolved: 2024-06-13

## 2024-06-13 PROCEDURE — 99214 OFFICE O/P EST MOD 30 MIN: CPT | Performed by: FAMILY MEDICINE

## 2024-06-13 RX ORDER — BUPROPION HYDROCHLORIDE 150 MG/1
150 TABLET, EXTENDED RELEASE ORAL 2 TIMES DAILY
Qty: 180 TABLET | Refills: 2 | Status: SHIPPED | OUTPATIENT
Start: 2024-06-13

## 2024-06-13 NOTE — ASSESSMENT & PLAN NOTE
I ordered fasting blood work and asked the patient to have it done 1 week prior to her December visit with me.

## 2024-06-13 NOTE — ASSESSMENT & PLAN NOTE
Patient reports that depression is well-controlled.  She will continue bupropion  mg twice daily.  I renewed her prescription today.

## 2024-06-13 NOTE — ASSESSMENT & PLAN NOTE
Patient has hypertension.  Her blood pressure is well-controlled.  I checked her blood pressure myself and found her blood pressure to be 132/76.  She is taking propranolol for blood pressure control.  This is also being prescribed for migraine prophylaxis.  I recommended no change with her current medication.

## 2024-06-13 NOTE — PROGRESS NOTES
Ambulatory Visit  Name: Mallika Jenkins      : 1975      MRN: [id number]  Encounter Provider: Wally Abraham DO  Encounter Date: 2024   Encounter department: [facility] PRIMARY [facility]ARE    Assessment & Plan   1. Essential hypertension  Assessment & Plan:  Patient has hypertension.  Her blood pressure is well-controlled.  I checked her blood pressure myself and found her blood pressure to be 132/76.  She is taking propranolol for blood pressure control.  This is also being prescribed for migraine prophylaxis.  I recommended no change with her current medication.  Orders:  -     Comprehensive metabolic panel; Future  2. Major depressive disorder, single episode, mild (HCC)  Assessment & Plan:  Patient reports that depression is well-controlled.  She will continue bupropion  mg twice daily.  I renewed her prescription today.  Orders:  -     buPROPion (WELLBUTRIN SR) 150 mg 12 hr tablet; Take 1 tablet (150 mg total) by mouth 2 (two) times a day  3. Dyslipidemia  Assessment & Plan:  I ordered fasting blood work and asked the patient to have it done 1 week prior to her December visit with me.  Orders:  -     Lipid Panel with Direct LDL reflex; Future  4. Other fatigue  -     CBC and differential; Future  -     TSH, 3rd generation with Free T4 reflex; Future  5. Migraine without aura and without status migrainosus, not intractable  Assessment & Plan:  Patient states migraines are currently well-controlled.  Continue propranolol.  BMI    BMI Counseling: Body mass index is 35.69 kg/m². The BMI is above normal. Nutrition recommendations include decreasing overall calorie intake. Exercise recommendations include exercising 3-5 times per week.     History of Present Illness     This is a 48-year-old white female who presents to the office today for her routine checkup.  Patient is doing well.  She tells me that the only problem she has had was a possible urinary tract infection.  She went to urgent care.   "She was experiencing a burning in her urethra but not while she was urinating.  She sometimes would feel a pressure like she had to urinate.  She went to urgent care.  She was given a 7-day course of Keflex.  She had a urine culture back which only showed contaminants.  She tells me that urgent care recommended she go to her gynecologist.  She wants my advice.  Patient tells me she is walking about 3 times per week.        Review of Systems   Constitutional:  Negative for activity change, appetite change and unexpected weight change.   Respiratory:  Negative for cough, shortness of breath and wheezing.    Cardiovascular:  Negative for chest pain, palpitations and leg swelling.   Genitourinary:  Negative for dysuria, frequency and urgency.        As noted, patient develops a burning in vaginal area and she admits to occasional, although rare, stress incontinence.       Objective     /76 (BP Location: Left arm, Patient Position: Sitting, Cuff Size: Large)   Pulse 65   Temp 97.6 °F (36.4 °C) (Tympanic)   Ht 5' 3\" (1.6 m)   Wt 91.4 kg (201 lb 8 oz)   SpO2 96%   BMI 35.69 kg/m²     Physical Exam  Vitals reviewed.   Constitutional:       Comments: Patient is a 48-year-old white female who appears her stated age.  She is pleasant, cooperative, and in no distress.   HENT:      Head: Normocephalic and atraumatic.      Right Ear: Tympanic membrane, ear canal and external ear normal. There is no impacted cerumen.      Left Ear: Tympanic membrane, ear canal and external ear normal. There is no impacted cerumen.      Mouth/Throat:      Mouth: Mucous membranes are moist.      Pharynx: Oropharynx is clear. No oropharyngeal exudate or posterior oropharyngeal erythema.   Eyes:      General: No scleral icterus.        Right eye: No discharge.         Left eye: No discharge.      Conjunctiva/sclera: Conjunctivae normal.      Pupils: Pupils are equal, round, and reactive to light.   Cardiovascular:      Rate and Rhythm: " Normal rate and regular rhythm.      Heart sounds: Normal heart sounds. No murmur heard.     No friction rub. No gallop.   Pulmonary:      Effort: Pulmonary effort is normal. No respiratory distress.      Breath sounds: Normal breath sounds. No stridor. No wheezing, rhonchi or rales.   Abdominal:      General: Bowel sounds are normal. There is no distension.      Palpations: Abdomen is soft. There is no mass.      Tenderness: There is no abdominal tenderness. There is no guarding.   Musculoskeletal:      Cervical back: Neck supple.   Lymphadenopathy:      Cervical: No cervical adenopathy.   Psychiatric:         Mood and Affect: Mood normal.         Behavior: Behavior normal.         Thought Content: Thought content normal.         Judgment: Judgment normal.     Extremities: Without cyanosis, clubbing, or edema  Administrative Statements

## 2024-06-18 ENCOUNTER — VBI (OUTPATIENT)
Dept: ADMINISTRATIVE | Facility: OTHER | Age: 49
End: 2024-06-18

## 2024-08-09 DIAGNOSIS — K21.9 GASTROESOPHAGEAL REFLUX DISEASE: ICD-10-CM

## 2024-08-09 RX ORDER — OMEPRAZOLE 40 MG/1
CAPSULE, DELAYED RELEASE ORAL
Qty: 90 CAPSULE | Refills: 1 | Status: SHIPPED | OUTPATIENT
Start: 2024-08-09

## 2024-10-10 DIAGNOSIS — I10 HYPERTENSION, UNSPECIFIED TYPE: ICD-10-CM

## 2024-10-10 RX ORDER — PROPRANOLOL HCL 60 MG
CAPSULE, EXTENDED RELEASE 24HR ORAL
Qty: 90 CAPSULE | Refills: 1 | Status: SHIPPED | OUTPATIENT
Start: 2024-10-10

## 2024-10-14 ENCOUNTER — TELEPHONE (OUTPATIENT)
Age: 49
End: 2024-10-14

## 2024-11-12 DIAGNOSIS — R60.9 EDEMA, UNSPECIFIED TYPE: ICD-10-CM

## 2024-11-13 RX ORDER — FUROSEMIDE 20 MG/1
TABLET ORAL
Qty: 90 TABLET | Refills: 1 | Status: SHIPPED | OUTPATIENT
Start: 2024-11-13

## 2024-12-11 ENCOUNTER — RA CDI HCC (OUTPATIENT)
Dept: OTHER | Facility: HOSPITAL | Age: 49
End: 2024-12-11

## 2024-12-11 PROBLEM — Z79.899 ENCOUNTER FOR LONG-TERM (CURRENT) USE OF MEDICATIONS: Status: RESOLVED | Noted: 2023-12-07 | Resolved: 2024-12-11

## 2024-12-11 PROBLEM — Z23 ENCOUNTER FOR IMMUNIZATION: Status: RESOLVED | Noted: 2021-11-18 | Resolved: 2024-12-11

## 2024-12-11 PROBLEM — M54.6 ACUTE RIGHT-SIDED THORACIC BACK PAIN: Status: RESOLVED | Noted: 2020-09-23 | Resolved: 2024-12-11

## 2024-12-11 PROBLEM — Z79.899 MEDICATION MANAGEMENT: Status: RESOLVED | Noted: 2020-08-13 | Resolved: 2024-12-11

## 2024-12-11 NOTE — PROGRESS NOTES
HCC coding opportunities          Chart Reviewed number of suggestions sent to Provider: 1  E66.01     Patients Insurance        Commercial Insurance: [employer]

## 2024-12-14 LAB
ALBUMIN SERPL-MCNC: 4.3 G/DL (ref 3.5–5.7)
ALP SERPL-CCNC: 49 U/L (ref 35–120)
ALT SERPL-CCNC: 12 U/L
ANION GAP SERPL CALCULATED.3IONS-SCNC: 10 MMOL/L (ref 3–11)
AST SERPL-CCNC: 11 U/L
BASOPHILS # BLD AUTO: 0.1 THOU/CMM (ref 0–0.1)
BASOPHILS NFR BLD AUTO: 1 %
BILIRUB SERPL-MCNC: 0.4 MG/DL (ref 0.2–1)
BUN SERPL-MCNC: 18 MG/DL (ref 7–25)
CALCIUM SERPL-MCNC: 8.9 MG/DL (ref 8.5–10.5)
CHLORIDE SERPL-SCNC: 103 MMOL/L (ref 100–109)
CHOLEST SERPL-MCNC: 212 MG/DL
CHOLEST/HDLC SERPL: 4 {RATIO}
CO2 SERPL-SCNC: 26 MMOL/L (ref 21–31)
CREAT SERPL-MCNC: 0.79 MG/DL (ref 0.4–1.1)
CYTOLOGY CMNT CVX/VAG CYTO-IMP: ABNORMAL
DIFFERENTIAL METHOD BLD: NORMAL
EOSINOPHIL # BLD AUTO: 0.1 THOU/CMM (ref 0–0.5)
EOSINOPHIL NFR BLD AUTO: 2 %
ERYTHROCYTE [DISTWIDTH] IN BLOOD BY AUTOMATED COUNT: 14.9 % (ref 12–16)
GFR/BSA.PRED SERPLBLD CYS-BASED-ARV: 92 ML/MIN/{1.73_M2}
GLUCOSE SERPL-MCNC: 113 MG/DL (ref 65–99)
HCT VFR BLD AUTO: 38.9 % (ref 35–43)
HDLC SERPL-MCNC: 53 MG/DL (ref 23–92)
HGB BLD-MCNC: 12.8 G/DL (ref 11.5–14.5)
LDLC SERPL CALC-MCNC: 124 MG/DL
LYMPHOCYTES # BLD AUTO: 1.9 THOU/CMM (ref 1–3)
LYMPHOCYTES NFR BLD AUTO: 21 %
MCH RBC QN AUTO: 29.1 PG (ref 26–34)
MCHC RBC AUTO-ENTMCNC: 32.9 G/DL (ref 32–37)
MCV RBC AUTO: 88 FL (ref 80–100)
MONOCYTES # BLD AUTO: 0.5 THOU/CMM (ref 0.3–1)
MONOCYTES NFR BLD AUTO: 6 %
NEUTROPHILS # BLD AUTO: 6.2 THOU/CMM (ref 1.8–7.8)
NEUTROPHILS NFR BLD AUTO: 70 %
NONHDLC SERPL-MCNC: 159 MG/DL
PLATELET # BLD AUTO: 296 THOU/CMM (ref 140–350)
PMV BLD REES-ECKER: 9.2 FL (ref 7.5–11.3)
POTASSIUM SERPL-SCNC: 4.2 MMOL/L (ref 3.5–5.2)
PROT SERPL-MCNC: 6.6 G/DL (ref 6.3–8.3)
RBC # BLD AUTO: 4.41 MILL/CMM (ref 3.7–4.7)
SODIUM SERPL-SCNC: 139 MMOL/L (ref 135–145)
TRIGL SERPL-MCNC: 175 MG/DL
TSH SERPL-ACNC: 1.17 UIU/ML (ref 0.45–5.33)
WBC # BLD AUTO: 8.8 THOU/CMM (ref 4–10)

## 2024-12-15 DIAGNOSIS — M15.0 PRIMARY GENERALIZED (OSTEO)ARTHRITIS: ICD-10-CM

## 2024-12-17 RX ORDER — CELECOXIB 200 MG/1
200 CAPSULE ORAL DAILY
Qty: 90 CAPSULE | Refills: 1 | Status: SHIPPED | OUTPATIENT
Start: 2024-12-17

## 2024-12-18 ENCOUNTER — OFFICE VISIT (OUTPATIENT)
Dept: FAMILY MEDICINE CLINIC | Facility: CLINIC | Age: 49
End: 2024-12-18
Payer: COMMERCIAL

## 2024-12-18 VITALS
OXYGEN SATURATION: 96 % | BODY MASS INDEX: 35.67 KG/M2 | SYSTOLIC BLOOD PRESSURE: 123 MMHG | HEART RATE: 73 BPM | HEIGHT: 63 IN | WEIGHT: 201.3 LBS | DIASTOLIC BLOOD PRESSURE: 66 MMHG | TEMPERATURE: 97.2 F

## 2024-12-18 DIAGNOSIS — R73.01 IMPAIRED FASTING GLUCOSE: ICD-10-CM

## 2024-12-18 DIAGNOSIS — E78.5 DYSLIPIDEMIA: ICD-10-CM

## 2024-12-18 DIAGNOSIS — G43.009 MIGRAINE WITHOUT AURA AND WITHOUT STATUS MIGRAINOSUS, NOT INTRACTABLE: ICD-10-CM

## 2024-12-18 DIAGNOSIS — F33.9 DEPRESSION, RECURRENT (HCC): ICD-10-CM

## 2024-12-18 DIAGNOSIS — I10 ESSENTIAL HYPERTENSION: Primary | ICD-10-CM

## 2024-12-18 DIAGNOSIS — Z23 ENCOUNTER FOR IMMUNIZATION: ICD-10-CM

## 2024-12-18 DIAGNOSIS — Z12.31 ENCOUNTER FOR SCREENING MAMMOGRAM FOR MALIGNANT NEOPLASM OF BREAST: ICD-10-CM

## 2024-12-18 PROCEDURE — 99214 OFFICE O/P EST MOD 30 MIN: CPT | Performed by: FAMILY MEDICINE

## 2024-12-18 PROCEDURE — 90656 IIV3 VACC NO PRSV 0.5 ML IM: CPT | Performed by: FAMILY MEDICINE

## 2024-12-18 PROCEDURE — 90471 IMMUNIZATION ADMIN: CPT | Performed by: FAMILY MEDICINE

## 2024-12-18 RX ORDER — ANTIOX #8/OM3/DHA/EPA/LUT/ZEAX 250-2.5 MG
1 CAPSULE ORAL
COMMUNITY

## 2024-12-18 NOTE — ASSESSMENT & PLAN NOTE
Blood pressure is under excellent control.  I checked her blood pressure myself and found it to be 124/76.  Patient will continue propranolol 60 mg daily.  Propranolol is also being used for migraine prophylaxis.  Orders:    Basic metabolic panel; Future

## 2024-12-18 NOTE — PROGRESS NOTES
Name: Mallika Jenkins      : 1975      MRN: [id number]  Encounter Provider: Wally Abraham DO  Encounter Date: 2024   Encounter department: [facility]  :  Assessment & Plan  Essential hypertension  Blood pressure is under excellent control.  I checked her blood pressure myself and found it to be 124/76.  Patient will continue propranolol 60 mg daily.  Propranolol is also being used for migraine prophylaxis.  Orders:    Basic metabolic panel; Future    Impaired fasting glucose  As noted, I did review the patient's family history.  Her mother, father, and brother are all diabetics.  I reviewed her labs.  Her fasting blood glucose was noted to be 113.  I also note in her history that blood sugars have been even higher in the past.  I am going to recommend we monitor this.  I ordered a fasting blood sugar and hemoglobin A1c to be done for her next office visit.  Orders:    Hemoglobin A1C; Future    Encounter for screening mammogram for malignant neoplasm of breast    Orders:    Mammo screening bilateral w 3d and cad; Future    Encounter for immunization    Orders:    influenza vaccine preservative-free 0.5 mL IM (Fluzone, Afluria, Fluarix, Flulaval)    Migraine without aura and without status migrainosus, not intractable  Migraines are currently stable         Depression, recurrent (HCC)  Patient has depression which is currently well-controlled.  I will have her continue Wellbutrin  mg twice daily.  Continue Ambien 5 mg at bedtime as needed for insomnia.  I advised the patient she should only take Ambien if she has at least 7 hours to dedicate at night to sleep, otherwise she could develop a hangover effect from the medication.         Dyslipidemia  I reviewed with the patient her fasting lipid panel.  At this point, I am not starting the patient on medication.  I am recommending she continue diet and exercise for treatment.  I plan to recheck a lipid panel in 1 year.               "  History of Present Illness     This patient is a 49-year-old white female who presents to the office today for her routine checkup.  The patient is walking regularly for exercise.  She is trying to watch her diet.  She has been doing the keto diet but she is frustrated because she was unable to lose weight.  Her  was successful in losing weight with this diet.  Patient did have blood work done for her visit today.  I reviewed her family history.  Her mother, father, and brother are all diabetic.      Review of Systems   Constitutional:  Negative for activity change and appetite change.   Cardiovascular:  Negative for chest pain, palpitations and leg swelling.   Gastrointestinal:  Negative for abdominal distention, abdominal pain, blood in stool, constipation, diarrhea and nausea.   Genitourinary:  Negative for dysuria, frequency and urgency.   Neurological:  Negative for headaches.   Psychiatric/Behavioral:  Negative for dysphoric mood.        Objective   /66   Pulse 73   Temp (!) 97.2 °F (36.2 °C) (Tympanic)   Ht 5' 3\" (1.6 m)   Wt 91.3 kg (201 lb 4.8 oz)   SpO2 96%   BMI 35.66 kg/m²      Physical Exam  Vitals reviewed.   Constitutional:       Comments: This is a 49-year-old white female who appears her stated age.  Patient is pleasant, cooperative, and in no distress.   HENT:      Head: Normocephalic and atraumatic.      Right Ear: Tympanic membrane, ear canal and external ear normal. There is no impacted cerumen.      Left Ear: Tympanic membrane, ear canal and external ear normal. There is no impacted cerumen.      Mouth/Throat:      Mouth: Mucous membranes are moist.      Pharynx: Oropharynx is clear. No oropharyngeal exudate or posterior oropharyngeal erythema.   Eyes:      General: No scleral icterus.        Right eye: No discharge.         Left eye: No discharge.      Conjunctiva/sclera: Conjunctivae normal.      Pupils: Pupils are equal, round, and reactive to light.   Cardiovascular:    "   Rate and Rhythm: Normal rate and regular rhythm.      Heart sounds: Normal heart sounds. No murmur heard.     No friction rub. No gallop.   Pulmonary:      Effort: Pulmonary effort is normal. No respiratory distress.      Breath sounds: Normal breath sounds. No stridor. No wheezing, rhonchi or rales.   Abdominal:      General: Bowel sounds are normal. There is no distension.      Palpations: Abdomen is soft. There is no mass.      Tenderness: There is no abdominal tenderness. There is no guarding.   Musculoskeletal:      Cervical back: Neck supple.   Lymphadenopathy:      Cervical: No cervical adenopathy.   Psychiatric:         Mood and Affect: Mood normal.         Behavior: Behavior normal.         Thought Content: Thought content normal.         Judgment: Judgment normal.     Extremities: Without cyanosis, clubbing, or edema

## 2024-12-18 NOTE — ASSESSMENT & PLAN NOTE
As noted, I did review the patient's family history.  Her mother, father, and brother are all diabetics.  I reviewed her labs.  Her fasting blood glucose was noted to be 113.  I also note in her history that blood sugars have been even higher in the past.  I am going to recommend we monitor this.  I ordered a fasting blood sugar and hemoglobin A1c to be done for her next office visit.  Orders:    Hemoglobin A1C; Future

## 2024-12-18 NOTE — ASSESSMENT & PLAN NOTE
I reviewed with the patient her fasting lipid panel.  At this point, I am not starting the patient on medication.  I am recommending she continue diet and exercise for treatment.  I plan to recheck a lipid panel in 1 year.

## 2024-12-18 NOTE — ASSESSMENT & PLAN NOTE
Patient has depression which is currently well-controlled.  I will have her continue Wellbutrin  mg twice daily.  Continue Ambien 5 mg at bedtime as needed for insomnia.  I advised the patient she should only take Ambien if she has at least 7 hours to dedicate at night to sleep, otherwise she could develop a hangover effect from the medication.

## 2025-02-15 DIAGNOSIS — K21.9 GASTROESOPHAGEAL REFLUX DISEASE: ICD-10-CM

## 2025-02-15 RX ORDER — OMEPRAZOLE 40 MG/1
40 CAPSULE, DELAYED RELEASE ORAL DAILY
Qty: 90 CAPSULE | Refills: 1 | Status: SHIPPED | OUTPATIENT
Start: 2025-02-15

## 2025-04-09 DIAGNOSIS — I10 HYPERTENSION, UNSPECIFIED TYPE: ICD-10-CM

## 2025-04-10 RX ORDER — PROPRANOLOL HYDROCHLORIDE 60 MG/1
60 CAPSULE, EXTENDED RELEASE ORAL DAILY
Qty: 90 CAPSULE | Refills: 1 | Status: SHIPPED | OUTPATIENT
Start: 2025-04-10

## 2025-04-17 ENCOUNTER — PATIENT MESSAGE (OUTPATIENT)
Dept: FAMILY MEDICINE CLINIC | Facility: CLINIC | Age: 50
End: 2025-04-17

## 2025-04-18 NOTE — PATIENT COMMUNICATION
Called patient and made her aware that there are no appointments available today. I suggested she go to urgent care. Patient was in agreement to this.

## 2025-05-18 DIAGNOSIS — R60.9 EDEMA, UNSPECIFIED TYPE: ICD-10-CM

## 2025-05-18 RX ORDER — FUROSEMIDE 20 MG/1
TABLET ORAL
Qty: 90 TABLET | Refills: 1 | Status: SHIPPED | OUTPATIENT
Start: 2025-05-18

## 2025-05-23 DIAGNOSIS — F32.0 MAJOR DEPRESSIVE DISORDER, SINGLE EPISODE, MILD (HCC): ICD-10-CM

## 2025-05-24 RX ORDER — BUPROPION HYDROCHLORIDE 150 MG/1
150 TABLET, EXTENDED RELEASE ORAL 2 TIMES DAILY
Qty: 180 TABLET | Refills: 1 | Status: SHIPPED | OUTPATIENT
Start: 2025-05-24

## 2025-05-27 ENCOUNTER — OFFICE VISIT (OUTPATIENT)
Dept: FAMILY MEDICINE CLINIC | Facility: CLINIC | Age: 50
End: 2025-05-27
Payer: COMMERCIAL

## 2025-05-27 VITALS
HEIGHT: 63 IN | OXYGEN SATURATION: 97 % | BODY MASS INDEX: 37.63 KG/M2 | SYSTOLIC BLOOD PRESSURE: 141 MMHG | HEART RATE: 63 BPM | WEIGHT: 212.4 LBS | TEMPERATURE: 97.9 F | DIASTOLIC BLOOD PRESSURE: 72 MMHG

## 2025-05-27 DIAGNOSIS — B34.9 VIRAL INFECTION, UNSPECIFIED: Primary | ICD-10-CM

## 2025-05-27 DIAGNOSIS — R00.2 PALPITATIONS: ICD-10-CM

## 2025-05-27 DIAGNOSIS — J20.9 ACUTE BRONCHITIS, UNSPECIFIED ORGANISM: ICD-10-CM

## 2025-05-27 LAB
SARS-COV-2 AG UPPER RESP QL IA: NEGATIVE
VALID CONTROL: NORMAL

## 2025-05-27 PROCEDURE — 99214 OFFICE O/P EST MOD 30 MIN: CPT | Performed by: FAMILY MEDICINE

## 2025-05-27 PROCEDURE — 87811 SARS-COV-2 COVID19 W/OPTIC: CPT | Performed by: FAMILY MEDICINE

## 2025-05-27 RX ORDER — AZITHROMYCIN 250 MG/1
TABLET, FILM COATED ORAL
Qty: 6 TABLET | Refills: 0 | Status: SHIPPED | OUTPATIENT
Start: 2025-05-27 | End: 2025-05-31

## 2025-05-27 RX ORDER — BENZONATATE 100 MG/1
CAPSULE ORAL
Qty: 40 CAPSULE | Refills: 0 | Status: SHIPPED | OUTPATIENT
Start: 2025-05-27

## 2025-05-27 NOTE — ASSESSMENT & PLAN NOTE
Patient has an acute bronchitis.  I started her on a Zithromax Z-NUVIA to take as directed.  For her cough, she was prescribed Tessalon Perles 100 mg.  She may take 1 to 2 capsules by mouth 3 times per day as needed for the cough.  Patient has been advised to drink plenty of fluids and rest.  She should take Tylenol as needed.  Return to office if no improvement or worsens.  Orders:    azithromycin (ZITHROMAX) 250 mg tablet; Take 2 tablets today then 1 tablet daily x 4 days    benzonatate (TESSALON PERLES) 100 mg capsule; Take 1-2 capsules by mouth three times per day as needed for cough

## 2025-05-27 NOTE — ASSESSMENT & PLAN NOTE
A rapid antigen COVID-19 test was done in the office today.  The test was negative.  The patient was informed of her results.  Orders:    Poct Covid 19 Rapid Antigen Test

## 2025-05-27 NOTE — ASSESSMENT & PLAN NOTE
Regarding the patient's watch, I do not know what 2% means.  The patient was unable to produce any rhythm strips for me to review.  On exam today she is certainly in sinus rhythm.  Her regular scheduled checkup for her blood pressure is in a few weeks.  I will have her keep that appointment.  I plan an EKG at that time.  I advised the patient that if she has any further episodes she needs to try to capture them on her watch so that I can review them.  Patient has been completely asymptomatic and I am not sure what her watch may actually have captured, if anything.

## 2025-05-27 NOTE — PROGRESS NOTES
Name: Mallika Jenkins      : 1975      MRN: [id number]  Encounter Provider: Wally Abraham DO  Encounter Date: 2025   Encounter department: [facility]  :  Assessment & Plan  Viral infection, unspecified  A rapid antigen COVID-19 test was done in the office today.  The test was negative.  The patient was informed of her results.  Orders:    Poct Covid 19 Rapid Antigen Test    Acute bronchitis, unspecified organism  Patient has an acute bronchitis.  I started her on a Zithromax Z-NUVIA to take as directed.  For her cough, she was prescribed Tessalon Perles 100 mg.  She may take 1 to 2 capsules by mouth 3 times per day as needed for the cough.  Patient has been advised to drink plenty of fluids and rest.  She should take Tylenol as needed.  Return to office if no improvement or worsens.  Orders:    azithromycin (ZITHROMAX) 250 mg tablet; Take 2 tablets today then 1 tablet daily x 4 days    benzonatate (TESSALON PERLES) 100 mg capsule; Take 1-2 capsules by mouth three times per day as needed for cough    Palpitations  Regarding the patient's watch, I do not know what 2% means.  The patient was unable to produce any rhythm strips for me to review.  On exam today she is certainly in sinus rhythm.  Her regular scheduled checkup for her blood pressure is in a few weeks.  I will have her keep that appointment.  I plan an EKG at that time.  I advised the patient that if she has any further episodes she needs to try to capture them on her watch so that I can review them.  Patient has been completely asymptomatic and I am not sure what her watch may actually have captured, if anything.               Depression Screening and Follow-up Plan: Patient was screened for depression during today's encounter. They screened negative with a PHQ-9 score of 0.        History of Present Illness   Patient is a 40 presents to the office today complaining of feeling ill.  I recently treated her send for an acute  "bronchitis.  The patient now presents with a 5-day history of nasal congestion, cough, subjective fevers.  She reports that her cough is productive of thick green sputum.  She also reports that when she awakens in the morning her eyes are crusty.  She has been using [facility] DM for the cough.  She tells me that she has bad coughing spells that are so bad she feels like she will pass out.  Of note, patient reports that last week she got an alert on her watch.  She tells me it was atrial fibrillation alert stating 2%.  She denies any palpitations      Review of Systems   Constitutional:  Positive for chills and fever.   HENT:  Positive for congestion and rhinorrhea.    Respiratory:  Positive for cough. Negative for shortness of breath and wheezing.         Positive for thick green sputum   Cardiovascular:  Negative for chest pain, palpitations and leg swelling.   Neurological:  Positive for headaches.       Objective   /72   Pulse 63   Temp 97.9 °F (36.6 °C) (Tympanic)   Ht 5' 3\" (1.6 m)   Wt 96.3 kg (212 lb 6.4 oz)   SpO2 97%   BMI 37.62 kg/m²      Physical Exam  Vitals reviewed.   Constitutional:       Comments: This is a 49-year-old white female who appears her stated age.  Patient is nonseptic in appearance and in no apparent distress   HENT:      Head: Normocephalic and atraumatic.      Comments: There is no tenderness to palpation over the paranasal sinuses     Right Ear: Tympanic membrane, ear canal and external ear normal. There is no impacted cerumen.      Left Ear: Tympanic membrane, ear canal and external ear normal. There is no impacted cerumen.      Mouth/Throat:      Mouth: Mucous membranes are moist.      Pharynx: Oropharynx is clear. No oropharyngeal exudate or posterior oropharyngeal erythema.     Eyes:      General:         Right eye: No discharge.         Left eye: No discharge.      Conjunctiva/sclera: Conjunctivae normal.      Pupils: Pupils are equal, round, and reactive to light. "       Cardiovascular:      Rate and Rhythm: Normal rate and regular rhythm.      Heart sounds: Normal heart sounds. No murmur heard.     No friction rub. No gallop.      Comments: Absolutely no ectopy noted on exam  Pulmonary:      Effort: Pulmonary effort is normal. No respiratory distress.      Breath sounds: Normal breath sounds. No stridor. No wheezing, rhonchi or rales.     Musculoskeletal:      Cervical back: Neck supple.   Lymphadenopathy:      Cervical: No cervical adenopathy.     Psychiatric:         Mood and Affect: Mood normal.         Behavior: Behavior normal.         Thought Content: Thought content normal.         Judgment: Judgment normal.     Extremities: Without cyanosis, clubbing, or edema

## 2025-06-15 DIAGNOSIS — M15.0 PRIMARY GENERALIZED (OSTEO)ARTHRITIS: ICD-10-CM

## 2025-06-15 RX ORDER — CELECOXIB 200 MG/1
200 CAPSULE ORAL DAILY
Qty: 90 CAPSULE | Refills: 1 | Status: SHIPPED | OUTPATIENT
Start: 2025-06-15

## 2025-06-18 ENCOUNTER — RA CDI HCC (OUTPATIENT)
Dept: OTHER | Facility: HOSPITAL | Age: 50
End: 2025-06-18

## 2025-07-12 LAB
ANION GAP SERPL CALCULATED.3IONS-SCNC: 9 MMOL/L (ref 3–11)
BUN SERPL-MCNC: 15 MG/DL (ref 7–25)
CALCIUM SERPL-MCNC: 8.8 MG/DL (ref 8.5–10.5)
CHLORIDE SERPL-SCNC: 103 MMOL/L (ref 100–109)
CO2 SERPL-SCNC: 28 MMOL/L (ref 21–31)
CREAT SERPL-MCNC: 0.77 MG/DL (ref 0.4–1.1)
CYTOLOGY CMNT CVX/VAG CYTO-IMP: ABNORMAL
EST. AVERAGE GLUCOSE BLD GHB EST-MCNC: 114 MG/DL
GFR/BSA.PRED SERPLBLD CYS-BASED-ARV: 94 ML/MIN/{1.73_M2}
GLUCOSE SERPL-MCNC: 107 MG/DL (ref 65–99)
HBA1C MFR BLD: 5.6 %
POTASSIUM SERPL-SCNC: 4.3 MMOL/L (ref 3.5–5.2)
SODIUM SERPL-SCNC: 140 MMOL/L (ref 135–145)

## 2025-07-15 ENCOUNTER — OFFICE VISIT (OUTPATIENT)
Dept: FAMILY MEDICINE CLINIC | Facility: CLINIC | Age: 50
End: 2025-07-15
Payer: COMMERCIAL

## 2025-07-15 VITALS
BODY MASS INDEX: 37.03 KG/M2 | HEIGHT: 63 IN | WEIGHT: 209 LBS | SYSTOLIC BLOOD PRESSURE: 134 MMHG | OXYGEN SATURATION: 96 % | DIASTOLIC BLOOD PRESSURE: 73 MMHG | HEART RATE: 63 BPM | TEMPERATURE: 97.5 F

## 2025-07-15 DIAGNOSIS — I48.0 PAROXYSMAL ATRIAL FIBRILLATION (HCC): ICD-10-CM

## 2025-07-15 DIAGNOSIS — E78.5 DYSLIPIDEMIA: ICD-10-CM

## 2025-07-15 DIAGNOSIS — R53.83 OTHER FATIGUE: ICD-10-CM

## 2025-07-15 DIAGNOSIS — R73.01 IMPAIRED FASTING GLUCOSE: ICD-10-CM

## 2025-07-15 DIAGNOSIS — I10 ESSENTIAL HYPERTENSION: Primary | ICD-10-CM

## 2025-07-15 PROCEDURE — 99214 OFFICE O/P EST MOD 30 MIN: CPT | Performed by: FAMILY MEDICINE

## 2025-07-15 PROCEDURE — 93000 ELECTROCARDIOGRAM COMPLETE: CPT | Performed by: FAMILY MEDICINE

## 2025-07-15 RX ORDER — ASPIRIN 81 MG/1
81 TABLET ORAL DAILY
Start: 2025-07-15

## 2025-07-29 ENCOUNTER — HOSPITAL ENCOUNTER (OUTPATIENT)
Dept: NON INVASIVE DIAGNOSTICS | Facility: HOSPITAL | Age: 50
Discharge: HOME | End: 2025-07-29
Attending: FAMILY MEDICINE
Payer: COMMERCIAL

## 2025-07-29 DIAGNOSIS — I48.0 PAROXYSMAL ATRIAL FIBRILLATION (HCC): ICD-10-CM

## 2025-07-29 PROCEDURE — 93225 XTRNL ECG REC<48 HRS REC: CPT

## 2025-07-29 PROCEDURE — 93226 XTRNL ECG REC<48 HR SCAN A/R: CPT

## 2025-07-29 PROCEDURE — 93227 XTRNL ECG REC<48 HR R&I: CPT | Performed by: INTERNAL MEDICINE

## 2025-07-31 ENCOUNTER — RESULTS FOLLOW-UP (OUTPATIENT)
Dept: FAMILY MEDICINE CLINIC | Facility: CLINIC | Age: 50
End: 2025-07-31

## 2025-08-07 ENCOUNTER — OFFICE VISIT (OUTPATIENT)
Dept: FAMILY MEDICINE CLINIC | Facility: CLINIC | Age: 50
End: 2025-08-07
Payer: COMMERCIAL

## 2025-08-07 VITALS
SYSTOLIC BLOOD PRESSURE: 156 MMHG | BODY MASS INDEX: 36.66 KG/M2 | WEIGHT: 206.9 LBS | HEIGHT: 63 IN | TEMPERATURE: 96.7 F | HEART RATE: 63 BPM | OXYGEN SATURATION: 98 % | DIASTOLIC BLOOD PRESSURE: 74 MMHG

## 2025-08-07 DIAGNOSIS — N12 PYELONEPHRITIS: Primary | ICD-10-CM

## 2025-08-07 PROCEDURE — 99213 OFFICE O/P EST LOW 20 MIN: CPT | Performed by: FAMILY MEDICINE

## 2025-08-07 RX ORDER — CIPROFLOXACIN 500 MG/1
500 TABLET, FILM COATED ORAL 2 TIMES DAILY
Qty: 20 TABLET | Refills: 0 | Status: SHIPPED | OUTPATIENT
Start: 2025-08-07 | End: 2025-08-17

## 2025-08-07 RX ORDER — NITROFURANTOIN 25; 75 MG/1; MG/1
100 CAPSULE ORAL 2 TIMES DAILY
COMMUNITY
Start: 2025-08-04 | End: 2025-08-07 | Stop reason: HOSPADM

## 2025-08-12 ENCOUNTER — HOSPITAL ENCOUNTER (OUTPATIENT)
Dept: ULTRASOUND IMAGING | Facility: HOSPITAL | Age: 50
Discharge: HOME | End: 2025-08-12
Attending: FAMILY MEDICINE
Payer: COMMERCIAL